# Patient Record
Sex: FEMALE | Employment: PART TIME | URBAN - METROPOLITAN AREA
[De-identification: names, ages, dates, MRNs, and addresses within clinical notes are randomized per-mention and may not be internally consistent; named-entity substitution may affect disease eponyms.]

---

## 2019-04-22 ENCOUNTER — OFFICE VISIT (OUTPATIENT)
Dept: INTERNAL MEDICINE CLINIC | Age: 39
End: 2019-04-22

## 2019-04-22 VITALS
HEIGHT: 59 IN | SYSTOLIC BLOOD PRESSURE: 118 MMHG | DIASTOLIC BLOOD PRESSURE: 84 MMHG | BODY MASS INDEX: 29.39 KG/M2 | TEMPERATURE: 97.9 F | OXYGEN SATURATION: 99 % | RESPIRATION RATE: 15 BRPM | HEART RATE: 69 BPM | WEIGHT: 145.8 LBS

## 2019-04-22 DIAGNOSIS — F32.1 CURRENT MODERATE EPISODE OF MAJOR DEPRESSIVE DISORDER, UNSPECIFIED WHETHER RECURRENT (HCC): ICD-10-CM

## 2019-04-22 DIAGNOSIS — E55.9 VITAMIN D DEFICIENCY: ICD-10-CM

## 2019-04-22 DIAGNOSIS — Z23 ENCOUNTER FOR IMMUNIZATION: ICD-10-CM

## 2019-04-22 DIAGNOSIS — N92.0 SPOTTING: ICD-10-CM

## 2019-04-22 DIAGNOSIS — Z00.00 ROUTINE GENERAL MEDICAL EXAMINATION AT A HEALTH CARE FACILITY: ICD-10-CM

## 2019-04-22 DIAGNOSIS — K62.5 RECTAL BLEEDING: Primary | ICD-10-CM

## 2019-04-22 DIAGNOSIS — K59.00 CONSTIPATION, UNSPECIFIED CONSTIPATION TYPE: ICD-10-CM

## 2019-04-22 RX ORDER — AMOXICILLIN 250 MG
1 CAPSULE ORAL DAILY
Qty: 30 TAB | Refills: 3 | Status: SHIPPED | OUTPATIENT
Start: 2019-04-22 | End: 2020-09-02 | Stop reason: SDUPTHER

## 2019-04-22 RX ORDER — LORATADINE 10 MG/1
10 TABLET ORAL
COMMUNITY
End: 2019-12-11 | Stop reason: ALTCHOICE

## 2019-04-22 RX ORDER — BUPROPION HYDROCHLORIDE 150 MG/1
150 TABLET ORAL
Qty: 30 TAB | Refills: 3 | Status: SHIPPED | OUTPATIENT
Start: 2019-04-22 | End: 2019-08-18 | Stop reason: SDUPTHER

## 2019-04-22 NOTE — PROGRESS NOTES
Health Maintenance Due Topic Date Due  
 DTaP/Tdap/Td series (1 - Tdap) 12/31/2002  PAP AKA CERVICAL CYTOLOGY  12/31/2002 No chief complaint on file. 1. Have you been to the ER, urgent care clinic since your last visit? Hospitalized since your last visit? No 
 
2. Have you seen or consulted any other health care providers outside of the [facility] since your last visit? Include any pap smears or colon screening. No 
 
3) Do you have an Advance Directive on file? no 
 
4) Are you interested in receiving information on Advance Directives? NO Patient is accompanied by  I have received verbal consent from SANYA Magallanes to discuss any/all medical information while they are present in the room. SANYA Magallanes is a 41 y.o. female  who presents for routine immunization(s) Tdap. Patient denies any symptoms , reactions or allergies that would exclude them from being immunized today. Risks and adverse reactions were discussed and the VIS was given to them. Patient voiced full understanding and signed Adult Immunization Consent form. All questions were addressed. Patient was observed for 10 min post injection. There were no reactions observed.  
 
Mohan Sotelo LPN

## 2019-04-22 NOTE — PROGRESS NOTES
HISTORY OF PRESENT ILLNESS Yi Trujillo is a 41 y.o. female here to establish care. Report constipation for long time. She is having bowel movement 1 or 2 times a week. Heavy having hard stool, every time she is straining she noticed blood in the stool. Report vaginal spotting during intercourse also. Having regular menses. She had a history of postpartum depression. She is still feeling depressed. Had suicidal thought in the past but not now. Having insomnia very often. Reports aches and pains in the joints and muscles. Very nonspecific. Here for complete physical. 
Need Tdap shot. HPI Review of Systems Constitutional: Negative. HENT: Negative. Eyes: Negative. Respiratory: Negative. Cardiovascular: Negative. Gastrointestinal: Positive for blood in stool and constipation. Genitourinary: Negative. Musculoskeletal: Negative. Skin: Negative. Neurological: Negative. Endo/Heme/Allergies: Negative. Psychiatric/Behavioral: Positive for depression. The patient has insomnia. Physical Exam  
Constitutional: She is oriented to person, place, and time. She appears well-developed and well-nourished. No distress. HENT:  
Head: Normocephalic and atraumatic. Right Ear: External ear normal.  
Left Ear: External ear normal.  
Nose: Nose normal.  
Mouth/Throat: Oropharynx is clear and moist. No oropharyngeal exudate. Eyes: Pupils are equal, round, and reactive to light. Conjunctivae and EOM are normal.  
Neck: Normal range of motion. Neck supple. No JVD present. Cardiovascular: Normal rate, regular rhythm, normal heart sounds and intact distal pulses. Pulmonary/Chest: Effort normal and breath sounds normal. No respiratory distress. She has no wheezes. Abdominal: Soft. Bowel sounds are normal. She exhibits no distension. There is no tenderness. Musculoskeletal: She exhibits no edema or tenderness. Lymphadenopathy:  
  She has no cervical adenopathy. Neurological: She is alert and oriented to person, place, and time. She has normal reflexes. She displays normal reflexes. No cranial nerve deficit. She exhibits normal muscle tone. Coordination normal.  
Psychiatric: PHQ score 13,moderate depression ASSESSMENT and PLAN Diagnoses and all orders for this visit: 1. Rectal bleeding Need more fluid and fibre. Possible anal fissure or hemorrhoid. 
-     REFERRAL TO COLON AND RECTAL SURGERY 
-     CBC WITH AUTOMATED DIFF 2. Routine general medical examination at a health care facility Seems healthy. adv to eat healthy and exercise. Will check, 
-     CBC WITH AUTOMATED DIFF 
-     METABOLIC PANEL, COMPREHENSIVE 
-     LIPID PANEL 
-     TSH 3RD GENERATION 
-     URINALYSIS W/ RFLX MICROSCOPIC 3. Spotting Having vaginal spotting during intercourse. Will refer, 
-     REFERRAL TO OBSTETRICS AND GYNECOLOGY 4. Constipation, unspecified constipation type High-fiber diet and fluid . Will give, 
-     psyllium seed-sucrose (METAMUCIL SUNRISE) powd; 1 table spoon in 8 oz of water QD 
-     senna-docusate (PERICOLACE) 8.6-50 mg per tablet; Take 1 Tab by mouth daily. 5. Vitamin D deficiency 
-     VITAMIN D, 25 HYDROXY 6. Encounter for immunization Will give, 
-     TETANUS, DIPHTHERIA TOXOIDS AND ACELLULAR PERTUSSIS VACCINE (TDAP), IN INDIVIDS. >=7, IM 
 
7. Current moderate episode of major depressive disorder, unspecified whether recurrent ([address] Moderate depression. Will start, 
-     buPROPion XL (WELLBUTRIN XL) 150 mg tablet; Take 1 Tab by mouth every morning. The risks and benefits of treatment were discussed as well as the potential side effects. The patient verbalized understanding and agrees to the current treatment plan. The patient is instructed to call the office with any side effects Discussed expected course/resolution/complications of diagnosis in detail with patient. Medication risks/benefits/costs/interactions/alternatives discussed with patient. Pt was given an after visit summary which includes diagnoses, current medications & vitals. Pt expressed understanding with the diagnosis and plan.

## 2019-04-22 NOTE — PATIENT INSTRUCTIONS

## 2019-04-23 LAB
25(OH)D3+25(OH)D2 SERPL-MCNC: 17 NG/ML (ref 30–100)
ALBUMIN SERPL-MCNC: 4.8 G/DL (ref 3.5–5.5)
ALBUMIN/GLOB SERPL: 1.6 {RATIO} (ref 1.2–2.2)
ALP SERPL-CCNC: 76 IU/L (ref 39–117)
ALT SERPL-CCNC: 13 IU/L (ref 0–32)
APPEARANCE UR: CLEAR
AST SERPL-CCNC: 21 IU/L (ref 0–40)
BASOPHILS # BLD AUTO: 0.1 X10E3/UL (ref 0–0.2)
BASOPHILS NFR BLD AUTO: 1 %
BILIRUB SERPL-MCNC: 0.3 MG/DL (ref 0–1.2)
BILIRUB UR QL STRIP: NEGATIVE
BUN SERPL-MCNC: 9 MG/DL (ref 6–20)
BUN/CREAT SERPL: 16 (ref 9–23)
CALCIUM SERPL-MCNC: 9.5 MG/DL (ref 8.7–10.2)
CHLORIDE SERPL-SCNC: 104 MMOL/L (ref 96–106)
CHOLEST SERPL-MCNC: 215 MG/DL (ref 100–199)
CO2 SERPL-SCNC: 26 MMOL/L (ref 20–29)
COLOR UR: YELLOW
CREAT SERPL-MCNC: 0.58 MG/DL (ref 0.57–1)
EOSINOPHIL # BLD AUTO: 0.4 X10E3/UL (ref 0–0.4)
EOSINOPHIL NFR BLD AUTO: 5 %
ERYTHROCYTE [DISTWIDTH] IN BLOOD BY AUTOMATED COUNT: 14.2 % (ref 12.3–15.4)
GLOBULIN SER CALC-MCNC: 3 G/DL (ref 1.5–4.5)
GLUCOSE SERPL-MCNC: 104 MG/DL (ref 65–99)
GLUCOSE UR QL: NEGATIVE
HCT VFR BLD AUTO: 40.9 % (ref 34–46.6)
HDLC SERPL-MCNC: 41 MG/DL
HGB BLD-MCNC: 13.2 G/DL (ref 11.1–15.9)
HGB UR QL STRIP: NEGATIVE
IMM GRANULOCYTES # BLD AUTO: 0 X10E3/UL (ref 0–0.1)
IMM GRANULOCYTES NFR BLD AUTO: 0 %
INTERPRETATION, 910389: NORMAL
KETONES UR QL STRIP: NEGATIVE
LDLC SERPL CALC-MCNC: 140 MG/DL (ref 0–99)
LEUKOCYTE ESTERASE UR QL STRIP: NEGATIVE
LYMPHOCYTES # BLD AUTO: 2.6 X10E3/UL (ref 0.7–3.1)
LYMPHOCYTES NFR BLD AUTO: 31 %
MCH RBC QN AUTO: 28.1 PG (ref 26.6–33)
MCHC RBC AUTO-ENTMCNC: 32.3 G/DL (ref 31.5–35.7)
MCV RBC AUTO: 87 FL (ref 79–97)
MICRO URNS: NORMAL
MONOCYTES # BLD AUTO: 0.5 X10E3/UL (ref 0.1–0.9)
MONOCYTES NFR BLD AUTO: 5 %
NEUTROPHILS # BLD AUTO: 5 X10E3/UL (ref 1.4–7)
NEUTROPHILS NFR BLD AUTO: 58 %
NITRITE UR QL STRIP: NEGATIVE
PH UR STRIP: 6 [PH] (ref 5–7.5)
PLATELET # BLD AUTO: 474 X10E3/UL (ref 150–379)
POTASSIUM SERPL-SCNC: 4.4 MMOL/L (ref 3.5–5.2)
PROT SERPL-MCNC: 7.8 G/DL (ref 6–8.5)
PROT UR QL STRIP: NEGATIVE
RBC # BLD AUTO: 4.7 X10E6/UL (ref 3.77–5.28)
SODIUM SERPL-SCNC: 142 MMOL/L (ref 134–144)
SP GR UR: 1.01 (ref 1–1.03)
TRIGL SERPL-MCNC: 168 MG/DL (ref 0–149)
TSH SERPL DL<=0.005 MIU/L-ACNC: 5.35 UIU/ML (ref 0.45–4.5)
UROBILINOGEN UR STRIP-MCNC: 0.2 MG/DL (ref 0.2–1)
VLDLC SERPL CALC-MCNC: 34 MG/DL (ref 5–40)
WBC # BLD AUTO: 8.5 X10E3/UL (ref 3.4–10.8)

## 2019-04-30 DIAGNOSIS — E55.9 VITAMIN D DEFICIENCY: Primary | ICD-10-CM

## 2019-04-30 RX ORDER — ERGOCALCIFEROL 1.25 MG/1
50000 CAPSULE ORAL
Qty: 4 CAP | Refills: 3 | Status: SHIPPED | OUTPATIENT
Start: 2019-04-30 | End: 2019-09-23 | Stop reason: ALTCHOICE

## 2019-04-30 NOTE — PROGRESS NOTES
Elevated platelet count. Will repeat platelet count next month. If still elevated I will send her to hematologist Dr. Heaven Mcwilliams. Low thyroid, please repeat a free T4 TSH and thyroid antibody panel in 6 weeks to decide if she needs any medications. vit D level very low.will start on vit D 50,000 unit 1 cap weekly for 4 months. will repeat level in 4 months. adv to be on milk product and expose to sun for 20 min a day. LDL is high with high total cholesterol. adv to be on low cholesterol diet and exercise. will repeat lipid in 6 months.

## 2019-05-03 ENCOUNTER — TELEPHONE (OUTPATIENT)
Dept: INTERNAL MEDICINE CLINIC | Age: 39
End: 2019-05-03

## 2019-05-03 DIAGNOSIS — R79.89 ELEVATED PLATELET COUNT: ICD-10-CM

## 2019-05-03 DIAGNOSIS — E03.9 HYPOTHYROIDISM, UNSPECIFIED TYPE: Primary | ICD-10-CM

## 2019-05-03 NOTE — PROGRESS NOTES
Pt's  called back and was after verifying HIPAA, was advised of the pt's lab results and recommendations.  Will mail results and lab slip to the pt

## 2019-05-22 ENCOUNTER — OFFICE VISIT (OUTPATIENT)
Dept: INTERNAL MEDICINE CLINIC | Age: 39
End: 2019-05-22

## 2019-05-22 VITALS
DIASTOLIC BLOOD PRESSURE: 76 MMHG | OXYGEN SATURATION: 99 % | TEMPERATURE: 96.7 F | WEIGHT: 140 LBS | BODY MASS INDEX: 28.22 KG/M2 | HEIGHT: 59 IN | SYSTOLIC BLOOD PRESSURE: 120 MMHG | RESPIRATION RATE: 14 BRPM | HEART RATE: 83 BPM

## 2019-05-22 DIAGNOSIS — M77.8 FOREARM TENDONITIS: ICD-10-CM

## 2019-05-22 DIAGNOSIS — L30.9 DERMATITIS: Primary | ICD-10-CM

## 2019-05-22 DIAGNOSIS — F32.1 CURRENT MODERATE EPISODE OF MAJOR DEPRESSIVE DISORDER, UNSPECIFIED WHETHER RECURRENT (HCC): ICD-10-CM

## 2019-05-22 DIAGNOSIS — E03.9 HYPOTHYROIDISM, UNSPECIFIED TYPE: ICD-10-CM

## 2019-05-22 DIAGNOSIS — K59.00 CONSTIPATION, UNSPECIFIED CONSTIPATION TYPE: ICD-10-CM

## 2019-05-22 RX ORDER — DICLOFENAC SODIUM 10 MG/G
GEL TOPICAL
Qty: 100 G | Refills: 1 | Status: SHIPPED | OUTPATIENT
Start: 2019-05-22 | End: 2021-04-01 | Stop reason: SDUPTHER

## 2019-05-22 RX ORDER — BETAMETHASONE VALERATE 1.2 MG/G
OINTMENT TOPICAL DAILY
Qty: 30 G | Refills: 0 | Status: SHIPPED | OUTPATIENT
Start: 2019-05-22 | End: 2022-05-26 | Stop reason: SDUPTHER

## 2019-05-22 NOTE — PROGRESS NOTES
Health Maintenance Due   Topic Date Due    BREAST CANCER SCRN MAMMOGRAM  12/31/1999    PAP AKA CERVICAL CYTOLOGY  12/31/2002       No chief complaint on file. 1. Have you been to the ER, urgent care clinic since your last visit? Hospitalized since your last visit? No    2. Have you seen or consulted any other health care providers outside of the [address] [place] [address] since your last visit? Include any pap smears or colon screening. No    3) Do you have an Advance Directive on file? no    4) Are you interested in receiving information on Advance Directives? NO      Patient is accompanied by spouse I have received verbal consent from SANYA Magallanes to discuss any/all medical information while they are present in the room.

## 2019-05-22 NOTE — PROGRESS NOTES
HISTORY OF PRESENT ILLNESS  Liam Forde is a 41 y.o. female here to follow-up. Accompanied by her . Depression seems under control. Would like to continue same dose of Wellbutrin. No suicidal thought. Report rash on left leg, itchy. Constipation has improved. Need to continue Metamucil. Needed refill. Scheduled to see a colorectal surgeon for hemorrhoidal bleeding. Reviewed left forearm and arm pain since she has lifted a heavy back. Pain is reproducible. She has an appointment to see gynecologist for vaginal spotting. Labs reviewed with patient, has abnormal TSH. Lipid is also elevated. She will do repeat TSH level in few weeks. HPI      Review of Systems   Constitutional: Negative. HENT: Negative. Eyes: Negative. Respiratory: Negative. Cardiovascular: Negative. Gastrointestinal: Positive for blood in stool and constipation. Genitourinary: Negative. Musculoskeletal: Negative. Skin: Negative. Neurological: Negative. Endo/Heme/Allergies: Negative. Psychiatric/Behavioral: Positive for depression. The patient has insomnia. Physical Exam   Constitutional: She appears well-developed and well-nourished. No distress. Neck: Normal range of motion. Neck supple. No JVD present. Cardiovascular: Normal rate, regular rhythm, normal heart sounds and intact distal pulses. Pulmonary/Chest: Effort normal and breath sounds normal. No respiratory distress. She has no wheezes. Musculoskeletal: She exhibits tenderness. She exhibits no edema. Left forearm: Mild tenderness on stretching. Range of motion is okay. Lymphadenopathy:     She has no cervical adenopathy. Neurological: She is alert. Skin: Skin is dry. Rash noted. Left leg: Approximately 3 x 5 cm area of dry scaly sandpaper lesion present slightly itchy. Psychiatric:   PHQ score 15,moderate depression       ASSESSMENT and PLAN  Diagnoses and all orders for this visit:    1.  Dermatitis    Will call in,  -     betamethasone valerate (VALISONE) 0.1 % ointment; Apply  to affected area daily. 2. Forearm tendonitis    Rest and ice pack. Will call in,  -     diclofenac (VOLTAREN) 1 % gel; Apply  to affected area two (2) times daily as needed for Pain. 3. Hypothyroidism, unspecified type  Has abnormal TSH. We will repeat TSH and free T4 in 2 more weeks. If it is abnormal, we will start her on Synthroid. 4. Constipation, unspecified constipation type    Need to drink more fluid and fiber. Will refill,  -     psyllium seed-sucrose (METAMUCIL SUNRISE) powd; 1 table spoon in 8 oz of water QD  Still having some hemorrhoidal bleeding sometimes when she is straining. Scheduled appointment with colorectal surgeon  5. Current moderate episode of major depressive disorder, unspecified whether recurrent ([address]    On Wellbutrin. Doing well. Discussed expected course/resolution/complications of diagnosis in detail with patient. Medication risks/benefits/costs/interactions/alternatives discussed with patient. Pt was given an after visit summary which includes diagnoses, current medications & vitals. Pt expressed understanding with the diagnosis and plan.

## 2019-05-28 ENCOUNTER — OFFICE VISIT (OUTPATIENT)
Dept: OBGYN CLINIC | Age: 39
End: 2019-05-28

## 2019-05-28 VITALS — DIASTOLIC BLOOD PRESSURE: 72 MMHG | WEIGHT: 142 LBS | SYSTOLIC BLOOD PRESSURE: 118 MMHG

## 2019-05-28 DIAGNOSIS — R10.2 PELVIC PAIN IN FEMALE: Primary | ICD-10-CM

## 2019-05-28 DIAGNOSIS — N94.10 DYSPAREUNIA, FEMALE: ICD-10-CM

## 2019-05-28 DIAGNOSIS — K59.00 CONSTIPATION, UNSPECIFIED CONSTIPATION TYPE: ICD-10-CM

## 2019-05-28 LAB
HCG URINE, QL. (POC): NEGATIVE
VALID INTERNAL CONTROL?: YES

## 2019-05-28 RX ORDER — BUPROPION HYDROCHLORIDE 150 MG/1
TABLET ORAL
COMMUNITY
Start: 2019-05-18 | End: 2019-06-13

## 2019-05-28 RX ORDER — POLOXAMER 188/SORBITOL/UREA
CLEANSER (ML) TOPICAL
COMMUNITY
Start: 2019-05-22 | End: 2021-12-02 | Stop reason: ALTCHOICE

## 2019-05-28 NOTE — PROGRESS NOTES
Pelvic Pain    CC: Pelvic Pain    HPI: Ms. Laila Larios is a 41 y.o. No obstetric history on file. female presenting for evaluation of pelvic pain. Patient's last menstrual period was 2019 (exact date). . Her past medical history is notable for nothing. She has no additional complaints and has not yet been evaluated for pelvic pain. Reports LLQ pain constantly that is worse with IC. Also reports constipation    Onset: month  Location of pain: LLQ  Duration: none  Quality of pain: none  Worse with menses? no  Pain between menstrual cycles? yes  Severity? 4 on a scale of 1-10  Timing: chronic. intermittent  Context: see above  Modifying factors:  Improves with rest    Other associated symptoms:   Denies vaginal discharge. Denies abdominal distension, diarrhea, dysuria, hematuria, incontinence, urinary urgency/frequency. Denies fevers/chills. Ob/Gyn Hx:  No obstetric history on file.    (2C/S)  Patient's last menstrual period was 2019 (exact date). Menses: Regular monthly cycles? yes  Contraception: condomes  STI- Denies  SA- yes    No past medical history on file. Past Surgical History:   Procedure Laterality Date    HX  SECTION      HX PELVIC LAPAROSCOPY         No family history on file.     Social History     Socioeconomic History    Marital status:      Spouse name: Not on file    Number of children: Not on file    Years of education: Not on file    Highest education level: Not on file   Occupational History    Not on file   Social Needs    Financial resource strain: Not on file    Food insecurity:     Worry: Not on file     Inability: Not on file    Transportation needs:     Medical: Not on file     Non-medical: Not on file   Tobacco Use    Smoking status: Never Smoker    Smokeless tobacco: Never Used   Substance and Sexual Activity    Alcohol use: Not Currently     Frequency: Never    Drug use: Never    Sexual activity: Yes     Partners: Male     Birth control/protection: Condom   Lifestyle    Physical activity:     Days per week: Not on file     Minutes per session: Not on file    Stress: Not on file   Relationships    Social connections:     Talks on phone: Not on file     Gets together: Not on file     Attends Denominational service: Not on file     Active member of club or organization: Not on file     Attends meetings of clubs or organizations: Not on file     Relationship status: Not on file    Intimate partner violence:     Fear of current or ex partner: Not on file     Emotionally abused: Not on file     Physically abused: Not on file     Forced sexual activity: Not on file   Other Topics Concern    Not on file   Social History Narrative    Not on file       Current Outpatient Medications   Medication Sig Dispense Refill    NATURAL DAILY FIBER 3.4 gram/5.8 gram powd       buPROPion XL (WELLBUTRIN XL) 150 mg tablet          Not on File    Review of Systems - History obtained from the patient  Constitutional: negative for weight loss, fever, night sweats  HEENT: negative for hearing loss, earache, congestion, snoring, sorethroat  CV: negative for chest pain, palpitations, edema  Resp: negative for cough, shortness of breath, wheezing  GI: negative for change in bowel habits, abdominal pain, black or bloody stools  : negative for frequency, dysuria, hematuria, vaginal discharge  MSK: negative for back pain, joint pain, muscle pain  Breast: negative for breast lumps, nipple discharge, galactorrhea  Skin :negative for itching, rash, hives  Neuro: negative for dizziness, headache, confusion, weakness  Psych: negative for anxiety, depression, change in mood  Heme/lymph: negative for bleeding, bruising, pallor    Physical Exam    Visit Vitals  /72 (BP 1 Location: Right arm, BP Patient Position: Sitting)   Wt 142 lb (64.4 kg)   LMP 05/19/2019 (Exact Date)       HENT  · Head and Face: appears normal    Neck  · Inspection/Palpation: normal appearance, no masses or tenderness  · Lymph Nodes: no lymphadenopathy present  · Thyroid: gland size normal, nontender, no nodules or masses present on palpation    Chest  · Respiratory Effort: non-labored breathing  · Auscultation: Clear to auscultation bilaterlly, normal breath sounds    Cardiovascular  · Heart:  · Auscultation: regular rate and rhythm without murmur  · Extremities: no peripheral edema    Gastrointestinal  · Abdominal Examination: abdomen non-tender to palpation, normal bowel sounds, no masses present  · Liver and spleen: no hepatomegaly present, spleen not palpable  · Hernias: no hernias identified    Genitourinary  · External Genitalia: normal appearance for age, no discharge present, no tenderness present, no inflammatory lesions present, no masses present, no atrophy present  · Vagina: normal vaginal vault without central or paravaginal defects, no discharge present, no inflammatory lesions present, no masses present  · Bladder: non-tender to palpation  · Urethra: appears normal  · Cervix: normal, no cervicitis, no CMT  · Uterus: normal size, shape and consistency, mobile  · Adnexa: no adnexal tenderness present, no adnexal masses present  · Perineum: perineum within normal limits, no evidence of trauma, no rashes or skin lesions present    Skin  · General Inspection: no rash, no lesions identified    Neurologic/Psychiatric  · Mental Status:  · Orientation: grossly oriented to person, place and time  · Mood and Affect: mood normal, affect appropriate    No results found for this or any previous visit. Patient was never admitted. Assessment/Plan:  41 y.o. with chronic pelvic pain. Discussed possible etiologies of pelvic pain including infectious verus gastrointestinal versus genitourinary versus idiopathic causes. Discussed possible adhesions from surgeries  STI testing sent . Urine culture UA sent .    DiscussedTVUS -ordered  Discussed increasing fiber now, may need to see GI in future  Discussed PRN NSAIDs now.     RTC:   For  [address] 5/28/2019  8:56 AM     Signed By: Burak Love MD     May 28, 2019

## 2019-05-29 LAB — BACTERIA UR CULT: NO GROWTH

## 2019-05-30 LAB
A VAGINAE DNA VAG QL NAA+PROBE: NORMAL SCORE
BVAB2 DNA VAG QL NAA+PROBE: NORMAL SCORE
C ALBICANS DNA VAG QL NAA+PROBE: NEGATIVE
C GLABRATA DNA VAG QL NAA+PROBE: NEGATIVE
C TRACH RRNA SPEC QL NAA+PROBE: NEGATIVE
MEGA1 DNA VAG QL NAA+PROBE: NORMAL SCORE
N GONORRHOEA RRNA SPEC QL NAA+PROBE: NEGATIVE
T VAGINALIS RRNA SPEC QL NAA+PROBE: NEGATIVE

## 2019-06-05 ENCOUNTER — HOSPITAL ENCOUNTER (EMERGENCY)
Age: 39
Discharge: HOME | End: 2019-06-05
Attending: EMERGENCY MEDICINE
Payer: COMMERCIAL

## 2019-06-05 ENCOUNTER — APPOINTMENT (OUTPATIENT)
Dept: CT IMAGING | Age: 39
End: 2019-06-05
Attending: PHYSICIAN ASSISTANT
Payer: COMMERCIAL

## 2019-06-05 VITALS
HEIGHT: 63 IN | SYSTOLIC BLOOD PRESSURE: 118 MMHG | HEART RATE: 74 BPM | BODY MASS INDEX: 25.16 KG/M2 | WEIGHT: 142 LBS | TEMPERATURE: 98.8 F | OXYGEN SATURATION: 99 % | RESPIRATION RATE: 18 BRPM | DIASTOLIC BLOOD PRESSURE: 72 MMHG

## 2019-06-05 DIAGNOSIS — R51.9 NONINTRACTABLE HEADACHE, UNSPECIFIED CHRONICITY PATTERN, UNSPECIFIED HEADACHE TYPE: Primary | ICD-10-CM

## 2019-06-05 LAB
ALBUMIN SERPL-MCNC: 3.9 G/DL (ref 3.5–5)
ALBUMIN/GLOB SERPL: 0.9 {RATIO} (ref 1.1–2.2)
ALP SERPL-CCNC: 89 U/L (ref 45–117)
ALT SERPL-CCNC: 20 U/L (ref 12–78)
ANION GAP SERPL CALC-SCNC: 7 MMOL/L (ref 5–15)
AST SERPL-CCNC: 21 U/L (ref 15–37)
BASOPHILS # BLD AUTO: 0 X10E3/UL (ref 0–0.2)
BASOPHILS # BLD: 0.1 K/UL (ref 0–0.1)
BASOPHILS NFR BLD AUTO: 1 %
BASOPHILS NFR BLD: 1 % (ref 0–1)
BILIRUB SERPL-MCNC: 0.3 MG/DL (ref 0.2–1)
BUN SERPL-MCNC: 10 MG/DL (ref 6–20)
BUN/CREAT SERPL: 17 (ref 12–20)
CALCIUM SERPL-MCNC: 9.6 MG/DL (ref 8.5–10.1)
CHLORIDE SERPL-SCNC: 106 MMOL/L (ref 97–108)
CO2 SERPL-SCNC: 24 MMOL/L (ref 21–32)
COMMENT, HOLDF: NORMAL
CREAT SERPL-MCNC: 0.58 MG/DL (ref 0.55–1.02)
DIFFERENTIAL METHOD BLD: ABNORMAL
EOSINOPHIL # BLD AUTO: 0.4 X10E3/UL (ref 0–0.4)
EOSINOPHIL # BLD: 0.3 K/UL (ref 0–0.4)
EOSINOPHIL NFR BLD AUTO: 5 %
EOSINOPHIL NFR BLD: 4 % (ref 0–7)
ERYTHROCYTE [DISTWIDTH] IN BLOOD BY AUTOMATED COUNT: 13.2 % (ref 11.5–14.5)
ERYTHROCYTE [DISTWIDTH] IN BLOOD BY AUTOMATED COUNT: 14.8 % (ref 12.3–15.4)
GLOBULIN SER CALC-MCNC: 4.4 G/DL (ref 2–4)
GLUCOSE SERPL-MCNC: 101 MG/DL (ref 65–100)
HCG UR QL: NEGATIVE
HCT VFR BLD AUTO: 34.4 % (ref 35–47)
HCT VFR BLD AUTO: 36.1 % (ref 34–46.6)
HGB BLD-MCNC: 10.7 G/DL (ref 11.5–16)
HGB BLD-MCNC: 12 G/DL (ref 11.1–15.9)
IMM GRANULOCYTES # BLD AUTO: 0 K/UL (ref 0–0.04)
IMM GRANULOCYTES # BLD AUTO: 0 X10E3/UL (ref 0–0.1)
IMM GRANULOCYTES NFR BLD AUTO: 0 %
IMM GRANULOCYTES NFR BLD AUTO: 0 % (ref 0–0.5)
LYMPHOCYTES # BLD AUTO: 2.2 X10E3/UL (ref 0.7–3.1)
LYMPHOCYTES # BLD: 2.9 K/UL (ref 0.8–3.5)
LYMPHOCYTES NFR BLD AUTO: 29 %
LYMPHOCYTES NFR BLD: 33 % (ref 12–49)
MCH RBC QN AUTO: 28.6 PG (ref 26–34)
MCH RBC QN AUTO: 28.8 PG (ref 26.6–33)
MCHC RBC AUTO-ENTMCNC: 31.1 G/DL (ref 30–36.5)
MCHC RBC AUTO-ENTMCNC: 33.2 G/DL (ref 31.5–35.7)
MCV RBC AUTO: 87 FL (ref 79–97)
MCV RBC AUTO: 92 FL (ref 80–99)
MONOCYTES # BLD AUTO: 0.4 X10E3/UL (ref 0.1–0.9)
MONOCYTES # BLD: 0.6 K/UL (ref 0–1)
MONOCYTES NFR BLD AUTO: 6 %
MONOCYTES NFR BLD: 7 % (ref 5–13)
NEUTROPHILS # BLD AUTO: 4.5 X10E3/UL (ref 1.4–7)
NEUTROPHILS NFR BLD AUTO: 59 %
NEUTS SEG # BLD: 4.8 K/UL (ref 1.8–8)
NEUTS SEG NFR BLD: 55 % (ref 32–75)
NRBC # BLD: 0 K/UL (ref 0–0.01)
NRBC BLD-RTO: 0 PER 100 WBC
PLATELET # BLD AUTO: 311 K/UL (ref 150–400)
PLATELET # BLD AUTO: 388 X10E3/UL (ref 150–450)
PMV BLD AUTO: 8.9 FL (ref 8.9–12.9)
POTASSIUM SERPL-SCNC: 4.1 MMOL/L (ref 3.5–5.1)
PROT SERPL-MCNC: 8.3 G/DL (ref 6.4–8.2)
RBC # BLD AUTO: 3.74 M/UL (ref 3.8–5.2)
RBC # BLD AUTO: 4.16 X10E6/UL (ref 3.77–5.28)
SAMPLES BEING HELD,HOLD: NORMAL
SODIUM SERPL-SCNC: 137 MMOL/L (ref 136–145)
T4 FREE SERPL-MCNC: 0.83 NG/DL (ref 0.82–1.77)
THYROGLOB AB SERPL-ACNC: 2.3 IU/ML (ref 0–0.9)
THYROPEROXIDASE AB SERPL-ACNC: 10 IU/ML (ref 0–34)
TSH SERPL DL<=0.005 MIU/L-ACNC: 3.6 UIU/ML (ref 0.45–4.5)
WBC # BLD AUTO: 7.5 X10E3/UL (ref 3.4–10.8)
WBC # BLD AUTO: 8.7 K/UL (ref 3.6–11)

## 2019-06-05 PROCEDURE — 74011250636 HC RX REV CODE- 250/636: Performed by: PHYSICIAN ASSISTANT

## 2019-06-05 PROCEDURE — 96361 HYDRATE IV INFUSION ADD-ON: CPT

## 2019-06-05 PROCEDURE — 81025 URINE PREGNANCY TEST: CPT

## 2019-06-05 PROCEDURE — 96374 THER/PROPH/DIAG INJ IV PUSH: CPT

## 2019-06-05 PROCEDURE — 85025 COMPLETE CBC W/AUTO DIFF WBC: CPT

## 2019-06-05 PROCEDURE — 99284 EMERGENCY DEPT VISIT MOD MDM: CPT

## 2019-06-05 PROCEDURE — 70450 CT HEAD/BRAIN W/O DYE: CPT

## 2019-06-05 PROCEDURE — 36415 COLL VENOUS BLD VENIPUNCTURE: CPT

## 2019-06-05 PROCEDURE — 80053 COMPREHEN METABOLIC PANEL: CPT

## 2019-06-05 PROCEDURE — 96375 TX/PRO/DX INJ NEW DRUG ADDON: CPT

## 2019-06-05 RX ORDER — ONDANSETRON 2 MG/ML
4 INJECTION INTRAMUSCULAR; INTRAVENOUS
Status: COMPLETED | OUTPATIENT
Start: 2019-06-05 | End: 2019-06-05

## 2019-06-05 RX ORDER — DIPHENHYDRAMINE HYDROCHLORIDE 50 MG/ML
25 INJECTION, SOLUTION INTRAMUSCULAR; INTRAVENOUS
Status: COMPLETED | OUTPATIENT
Start: 2019-06-05 | End: 2019-06-05

## 2019-06-05 RX ADMIN — PROCHLORPERAZINE EDISYLATE 10 MG: 5 INJECTION INTRAMUSCULAR; INTRAVENOUS at 10:43

## 2019-06-05 RX ADMIN — DIPHENHYDRAMINE HYDROCHLORIDE 25 MG: 50 INJECTION, SOLUTION INTRAMUSCULAR; INTRAVENOUS at 10:43

## 2019-06-05 RX ADMIN — SODIUM CHLORIDE 1000 ML: 900 INJECTION, SOLUTION INTRAVENOUS at 10:44

## 2019-06-05 RX ADMIN — ONDANSETRON 4 MG: 2 INJECTION INTRAMUSCULAR; INTRAVENOUS at 10:43

## 2019-06-05 NOTE — ED PROVIDER NOTES
41 y.o. female with no significant past medical history presents with complaints of left sided headache and dizziness which started suddenly at work today. The pt states that she has a hx of headaches but never this bad. The pt states that nothing makes the pain better or worse. The pt rates the pain as a 6/10 in severity. The pt describes the headache as a dull ache. The pt denies taking anything at home for the discomfort. Pt denies facial asymmetry, dysarthria, ataxia, unilateral weakness, or vision changes. There are no other acute medical complaints at this time. PCP: MD Kyra Gallego PA-C           Past Medical History:   Diagnosis Date    Depression        Past Surgical History:   Procedure Laterality Date    HX  SECTION      x2    HX  SECTION      HX DILATION AND CURETTAGE      HX PELVIC LAPAROSCOPY      HX SALPINGO-OOPHORECTOMY      left ,for ectopic pregnancy         Family History:   Problem Relation Age of Onset    Diabetes Mother     Heart Disease Mother     No Known Problems Father     Heart Disease Sister     Heart Disease Brother        Social History     Socioeconomic History    Marital status:      Spouse name: Not on file    Number of children: Not on file    Years of education: Not on file    Highest education level: Not on file   Occupational History    Not on file   Social Needs    Financial resource strain: Not on file    Food insecurity:     Worry: Not on file     Inability: Not on file    Transportation needs:     Medical: Not on file     Non-medical: Not on file   Tobacco Use    Smoking status: Never Smoker    Smokeless tobacco: Never Used   Substance and Sexual Activity    Alcohol use: Not Currently     Frequency: Never    Drug use: Never    Sexual activity: Yes     Partners: Male     Birth control/protection: Condom     Comment: working full time. 2 children.    Lifestyle    Physical activity:     Days per week: Not on file     Minutes per session: Not on file    Stress: Not on file   Relationships    Social connections:     Talks on phone: Not on file     Gets together: Not on file     Attends Voodoo service: Not on file     Active member of club or organization: Not on file     Attends meetings of clubs or organizations: Not on file     Relationship status: Not on file    Intimate partner violence:     Fear of current or ex partner: Not on file     Emotionally abused: Not on file     Physically abused: Not on file     Forced sexual activity: Not on file   Other Topics Concern    Not on file   Social History Narrative    ** Merged History Encounter **              ALLERGIES: Patient has no known allergies. Review of Systems   Constitutional: Negative for chills, diaphoresis and fever. HENT: Negative for congestion, postnasal drip, rhinorrhea and sore throat. Eyes: Negative for photophobia, discharge, redness and visual disturbance. Respiratory: Negative for cough, chest tightness, shortness of breath and wheezing. Cardiovascular: Negative for chest pain, palpitations and leg swelling. Gastrointestinal: Negative for abdominal distention, abdominal pain, blood in stool, constipation, diarrhea, nausea and vomiting. Genitourinary: Negative for difficulty urinating, dysuria, frequency, hematuria and urgency. Musculoskeletal: Negative for arthralgias, back pain, joint swelling and myalgias. Skin: Negative for color change and rash. Neurological: Positive for headaches. Negative for dizziness, speech difficulty, weakness, light-headedness and numbness. Psychiatric/Behavioral: Negative for confusion. The patient is not nervous/anxious. All other systems reviewed and are negative.       Vitals:    06/05/19 0919 06/05/19 0922   BP:  116/87   Pulse: 70 73   Resp:  20   Temp:  98.8 °F (37.1 °C)   SpO2: 100% 98%   Weight:  64.4 kg (142 lb)   Height:  5' 3\" (1.6 m)            Physical Exam Constitutional: She is oriented to person, place, and time. She appears well-developed and well-nourished. No distress. HENT:   Head: Normocephalic and atraumatic. Head is without raccoon's eyes, without Macdonald's sign and without laceration. Right Ear: Hearing, tympanic membrane, external ear and ear canal normal. No foreign bodies. Tympanic membrane is not bulging. No hemotympanum. Left Ear: Hearing, tympanic membrane, external ear and ear canal normal. No foreign bodies. Tympanic membrane is not bulging. No hemotympanum. Nose: Nose normal. No mucosal edema or rhinorrhea. Right sinus exhibits no maxillary sinus tenderness and no frontal sinus tenderness. Left sinus exhibits no maxillary sinus tenderness and no frontal sinus tenderness. Mouth/Throat: Uvula is midline, oropharynx is clear and moist and mucous membranes are normal. No tonsillar abscesses. Eyes: Pupils are equal, round, and reactive to light. Conjunctivae and EOM are normal. Right eye exhibits no discharge. Left eye exhibits no discharge. Neck: Normal range of motion. Neck supple. Cardiovascular: Normal rate, regular rhythm and normal heart sounds. Exam reveals no gallop and no friction rub. No murmur heard. Regular rate and rhythm. No murmurs, gallops, rubs, or clicks. Pulmonary/Chest: Effort normal and breath sounds normal. No respiratory distress. She has no wheezes. She has no rales. No stridor or wheezes. No accessory muscle usage. No nasal flaring. Breath Sounds equal bilaterally. Abdominal:       Musculoskeletal: Normal range of motion. She exhibits no edema, tenderness or deformity. Neurological: She is alert and oriented to person, place, and time. CN individually tested and are intact. No Pronator Drift. No nystagmus. Rapid Alternating movements are intact. Negative kernig's and brudzinski's sign. Skin: She is not diaphoretic. Nursing note and vitals reviewed.        MDM  Number of Diagnoses or Management Options  Nonintractable headache, unspecified chronicity pattern, unspecified headache type:   Diagnosis management comments: Pt afebrile and nontoxic appearing. Vitals, labs, physical exam, and imaging studies all unremarkable. No meningeal signs. No signs of PE, PTX, ACS, esophageal rupture, dissection, pancreatitis, appendicitis, cholecystitis/cholagnitis, bowel obstruction/perforation, sepsis or any other acute life threatening condition. Will treat symptomatically and advise close follow up with family doctor.   Homa Russell PA-C         Procedures

## 2019-06-05 NOTE — ED TRIAGE NOTES
Triage Note: Patient was at work today and stared with left sided headache and dizziness but denies syncopal episodes. No nausea or vomiting.

## 2019-06-05 NOTE — LETTER
[address] 
[address] [place], [place] [address] [id number] 
[phone] Work/School Note Date: 6/5/2019 To Whom It May concern: 
 
Bert Espinoza was seen and treated today in the emergency room by the following provider(s): 
Attending Provider: Grace Summers MD 
Physician Assistant: Dee Black PA-C. Bert Espinoza may return to work on 06/08/19.  
 
Sincerely, 
 
 
 
 
Anne Caldera PA-C

## 2019-06-05 NOTE — DISCHARGE INSTRUCTIONS
Patient Education        Headache: Care Instructions  Your Care Instructions    Headaches have many possible causes. Most headaches aren't a sign of a more serious problem, and they will get better on their own. Home treatment may help you feel better faster. The doctor has checked you carefully, but problems can develop later. If you notice any problems or new symptoms, get medical treatment right away. Follow-up care is a key part of your treatment and safety. Be sure to make and go to all appointments, and call your doctor if you are having problems. It's also a good idea to know your test results and keep a list of the medicines you take. How can you care for yourself at home? · Do not drive if you have taken a prescription pain medicine. · Rest in a quiet, dark room until your headache is gone. Close your eyes and try to relax or go to sleep. Don't watch TV or read. · Put a cold, moist cloth or cold pack on the painful area for 10 to 20 minutes at a time. Put a thin cloth between the cold pack and your skin. · Use a warm, moist towel or a heating pad set on low to relax tight shoulder and neck muscles. · Have someone gently massage your neck and shoulders. · Take pain medicines exactly as directed. ? If the doctor gave you a prescription medicine for pain, take it as prescribed. ? If you are not taking a prescription pain medicine, ask your doctor if you can take an over-the-counter medicine. · Be careful not to take pain medicine more often than the instructions allow, because you may get worse or more frequent headaches when the medicine wears off. · Do not ignore new symptoms that occur with a headache, such as a fever, weakness or numbness, vision changes, or confusion. These may be signs of a more serious problem. To prevent headaches  · Keep a headache diary so you can figure out what triggers your headaches. Avoiding triggers may help you prevent headaches.  Record when each headache began, how long it lasted, and what the pain was like (throbbing, aching, stabbing, or dull). Write down any other symptoms you had with the headache, such as nausea, flashing lights or dark spots, or sensitivity to bright light or loud noise. Note if the headache occurred near your period. List anything that might have triggered the headache, such as certain foods (chocolate, cheese, wine) or odors, smoke, bright light, stress, or lack of sleep. · Find healthy ways to deal with stress. Headaches are most common during or right after stressful times. Take time to relax before and after you do something that has caused a headache in the past.  · Try to keep your muscles relaxed by keeping good posture. Check your jaw, face, neck, and shoulder muscles for tension, and try relaxing them. When sitting at a desk, change positions often, and stretch for 30 seconds each hour. · Get plenty of sleep and exercise. · Eat regularly and well. Long periods without food can trigger a headache. · Treat yourself to a massage. Some people find that regular massages are very helpful in relieving tension. · Limit caffeine by not drinking too much coffee, tea, or soda. But don't quit caffeine suddenly, because that can also give you headaches. · Reduce eyestrain from computers by blinking frequently and looking away from the computer screen every so often. Make sure you have proper eyewear and that your monitor is set up properly, about an arm's length away. · Seek help if you have depression or anxiety. Your headaches may be linked to these conditions. Treatment can both prevent headaches and help with symptoms of anxiety or depression. When should you call for help? Call 911 anytime you think you may need emergency care. For example, call if:    · You have signs of a stroke. These may include:  ? Sudden numbness, paralysis, or weakness in your face, arm, or leg, especially on only one side of your body.   ? Sudden vision changes. ? Sudden trouble speaking. ? Sudden confusion or trouble understanding simple statements. ? Sudden problems with walking or balance. ? A sudden, severe headache that is different from past headaches.    Call your doctor now or seek immediate medical care if:    · You have a new or worse headache.     · Your headache gets much worse. Where can you learn more? Go to http://kishan-nichole.info/. Enter M271 in the search box to learn more about \"Headache: Care Instructions. \"  Current as of: Renetta 3, 2018  Content Version: 11.9  © 5145-9032 [employer]. Care instructions adapted under license by [employer] (which disclaims liability or warranty for this information). If you have questions about a medical condition or this instruction, always ask your healthcare professional. [place] [address] any warranty or liability for your use of this information. We hope that we have addressed all of your medical concerns. The examination and treatment you received in the Emergency Department were for an emergent problem and were not intended as complete care. It is important that you follow up with your healthcare provider(s) for ongoing care. If your symptoms worsen or do not improve as expected, and you are unable to reach your usual health care provider(s), you should return to the Emergency Department. Today's healthcare is undergoing tremendous change, and patient satisfaction surveys are one of the many tools to assess the quality of medical care. You may receive a survey from the [employer] organization regarding your experience in the Emergency Department. I hope that your experience has been completely positive, particularly the medical care that I provided. As such, please participate in the survey; anything less than excellent does not meet my expectations or intentions.         [address] [address] [facility] participate in nationally recognized quality of care measures. If your blood pressure is greater than 120/80, as reported below, we urge that you seek medical care to address the potential of high blood pressure, commonly known as hypertension. Hypertension can be hereditary or can be caused by certain medical conditions, pain, stress, or \"white coat syndrome. \"       Please make an appointment with your health care provider(s) for follow up of your Emergency Department visit. VITALS:   Patient Vitals for the past 8 hrs:   Temp Pulse Resp BP SpO2   06/05/19 0922 98.8 °F (37.1 °C) 73 20 116/87 98 %   06/05/19 0919 -- 70 -- -- 100 %          Thank you for allowing us to provide you with medical care today. We realize that you have many choices for your emergency care needs. Please choose us in the future for any continued health care needs. Mac Zamora [facility], [address] [place] [address].   Office: [phone]            Recent Results (from the past 24 hour(s))   METABOLIC PANEL, COMPREHENSIVE    Collection Time: 06/05/19  9:45 AM   Result Value Ref Range    Sodium 137 136 - 145 mmol/L    Potassium 4.1 3.5 - 5.1 mmol/L    Chloride 106 97 - 108 mmol/L    CO2 24 21 - 32 mmol/L    Anion gap 7 5 - 15 mmol/L    Glucose 101 (H) 65 - 100 mg/dL    BUN 10 6 - 20 MG/DL    Creatinine 0.58 0.55 - 1.02 MG/DL    BUN/Creatinine ratio 17 12 - 20      GFR est AA >60 >60 ml/min/1.73m2    GFR est non-AA >60 >60 ml/min/1.73m2    Calcium 9.6 8.5 - 10.1 MG/DL    Bilirubin, total 0.3 0.2 - 1.0 MG/DL    ALT (SGPT) 20 12 - 78 U/L    AST (SGOT) 21 15 - 37 U/L    Alk.  phosphatase 89 45 - 117 U/L    Protein, total 8.3 (H) 6.4 - 8.2 g/dL    Albumin 3.9 3.5 - 5.0 g/dL    Globulin 4.4 (H) 2.0 - 4.0 g/dL    A-G Ratio 0.9 (L) 1.1 - 2.2     SAMPLES BEING HELD    Collection Time: 06/05/19  9:45 AM   Result Value Ref Range    SAMPLES BEING HELD 1UA,1UC     COMMENT        Add-on orders for these samples will be processed based on acceptable specimen integrity and analyte stability, which may vary by analyte. HCG URINE, QL. - POC    Collection Time: 06/05/19  9:57 AM   Result Value Ref Range    Pregnancy test,urine (POC) NEGATIVE  NEG     CBC WITH AUTOMATED DIFF    Collection Time: 06/05/19 11:18 AM   Result Value Ref Range    WBC 8.7 3.6 - 11.0 K/uL    RBC 3.74 (L) 3.80 - 5.20 M/uL    HGB 10.7 (L) 11.5 - 16.0 g/dL    HCT 34.4 (L) 35.0 - 47.0 %    MCV 92.0 80.0 - 99.0 FL    MCH 28.6 26.0 - 34.0 PG    MCHC 31.1 30.0 - 36.5 g/dL    RDW 13.2 11.5 - 14.5 %    PLATELET 932 309 - 054 K/uL    MPV 8.9 8.9 - 12.9 FL    NRBC 0.0 0  WBC    ABSOLUTE NRBC 0.00 0.00 - 0.01 K/uL    NEUTROPHILS 55 32 - 75 %    LYMPHOCYTES 33 12 - 49 %    MONOCYTES 7 5 - 13 %    EOSINOPHILS 4 0 - 7 %    BASOPHILS 1 0 - 1 %    IMMATURE GRANULOCYTES 0 0.0 - 0.5 %    ABS. NEUTROPHILS 4.8 1.8 - 8.0 K/UL    ABS. LYMPHOCYTES 2.9 0.8 - 3.5 K/UL    ABS. MONOCYTES 0.6 0.0 - 1.0 K/UL    ABS. EOSINOPHILS 0.3 0.0 - 0.4 K/UL    ABS. BASOPHILS 0.1 0.0 - 0.1 K/UL    ABS. IMM. GRANS. 0.0 0.00 - 0.04 K/UL    DF AUTOMATED         Ct Head Wo Cont    Result Date: 6/5/2019  EXAM: CT HEAD WO CONT INDICATION: headache; dizziness COMPARISON: None. CONTRAST: None. TECHNIQUE: Unenhanced CT of the head was performed using 5 mm images. Brain and bone windows were generated. CT dose reduction was achieved through use of a standardized protocol tailored for this examination and automatic exposure control for dose modulation. FINDINGS: The ventricles and sulci are normal in size, shape and configuration and midline. There is no significant white matter disease. There is no intracranial hemorrhage, extra-axial collection, mass, mass effect or midline shift. The basilar cisterns are open. No acute infarct is identified. The bone windows demonstrate no abnormalities. There is a mucous changes cyst in the inferior right maxillary sinus. .     IMPRESSION: No evidence of acute process.

## 2019-06-11 NOTE — PROGRESS NOTES
Thyroid function test came back normal.  Only 1 antibodies and thyroid gland is slightly positive.   Will monitor thyroid function test.  No med needed right now

## 2019-06-13 ENCOUNTER — OFFICE VISIT (OUTPATIENT)
Dept: OBGYN CLINIC | Age: 39
End: 2019-06-13

## 2019-06-13 VITALS
DIASTOLIC BLOOD PRESSURE: 88 MMHG | WEIGHT: 138 LBS | BODY MASS INDEX: 24.45 KG/M2 | SYSTOLIC BLOOD PRESSURE: 122 MMHG | HEIGHT: 63 IN

## 2019-06-13 DIAGNOSIS — R10.2 PELVIC PAIN: Primary | ICD-10-CM

## 2019-06-17 DIAGNOSIS — E03.9 HYPOTHYROIDISM, UNSPECIFIED TYPE: ICD-10-CM

## 2019-06-17 DIAGNOSIS — R79.89 ELEVATED PLATELET COUNT: ICD-10-CM

## 2019-06-24 ENCOUNTER — TELEPHONE (OUTPATIENT)
Dept: INTERNAL MEDICINE CLINIC | Age: 39
End: 2019-06-24

## 2019-06-24 NOTE — TELEPHONE ENCOUNTER
Patients spouse called to make an appointment with Dr. Amisha Watt. He states patient is having pain level 8 in lower left side under abdomen. Appointment was offered with Chan Don on wed and I suggested he take the patient to [facility] clinic. Spouse was concerned about  for the appointment. I stated they have phones to use for interpretation. Spouse scheduled patient an appointment with Amisha Watt on 7/15/19. Spouse advised to take patient to urgent care or ER if symptoms get worse.

## 2019-07-15 ENCOUNTER — OFFICE VISIT (OUTPATIENT)
Dept: INTERNAL MEDICINE CLINIC | Age: 39
End: 2019-07-15

## 2019-07-15 VITALS
BODY MASS INDEX: 24.59 KG/M2 | RESPIRATION RATE: 15 BRPM | SYSTOLIC BLOOD PRESSURE: 116 MMHG | WEIGHT: 138.8 LBS | OXYGEN SATURATION: 98 % | HEIGHT: 63 IN | TEMPERATURE: 98.3 F | DIASTOLIC BLOOD PRESSURE: 74 MMHG | HEART RATE: 64 BPM

## 2019-07-15 DIAGNOSIS — F33.9 EPISODE OF RECURRENT MAJOR DEPRESSIVE DISORDER, UNSPECIFIED DEPRESSION EPISODE SEVERITY (HCC): ICD-10-CM

## 2019-07-15 DIAGNOSIS — M62.838 MUSCLE SPASM: Primary | ICD-10-CM

## 2019-07-15 DIAGNOSIS — E03.9 HYPOTHYROIDISM, UNSPECIFIED TYPE: ICD-10-CM

## 2019-07-15 DIAGNOSIS — R25.2 LEG CRAMP: ICD-10-CM

## 2019-07-15 DIAGNOSIS — R94.6 ABNORMAL THYROID FUNCTION TEST: ICD-10-CM

## 2019-07-15 DIAGNOSIS — Z12.4 SCREENING FOR CERVICAL CANCER: ICD-10-CM

## 2019-07-15 RX ORDER — BACLOFEN 10 MG/1
10 TABLET ORAL
Qty: 20 TAB | Refills: 0 | Status: SHIPPED | OUTPATIENT
Start: 2019-07-15 | End: 2020-07-13 | Stop reason: ALTCHOICE

## 2019-07-15 NOTE — PROGRESS NOTES
HISTORY OF PRESENT ILLNESS  Radha Agarwal is a 41 y.o. female here to follow-up. Accompanied by her . Report neck pain and stiffness for last several days. She works 35 hours a week. Not lifting weight. Report bilateral lower leg cramp on and off. Not drinking enough water. Having milk every day. Report left lower quadrant abdominal pain on and off. No pelvic discharge. No constipation now. Would like to get a birth control pill. Did not have a Pap smear for long time. Went to emergency room after feeling dizzy. Lab work was stable. She was dehydrated. Labs reviewed. Thyroid Problem   Associated symptoms include abdominal pain. Abdominal Pain   Associated symptoms include abdominal pain. Depression   Associated symptoms include abdominal pain. Neck Pain   Associated symptoms include abdominal pain. Review of Systems   Constitutional: Negative. HENT: Negative. Eyes: Negative. Respiratory: Negative. Cardiovascular: Negative. Gastrointestinal: Positive for abdominal pain. Genitourinary: Negative. Musculoskeletal: Positive for myalgias and neck pain. Skin: Negative. Neurological: Negative. Endo/Heme/Allergies: Negative. Psychiatric/Behavioral: Positive for depression. Physical Exam   Constitutional: She appears well-developed and well-nourished. No distress. Neck: Normal range of motion. Neck supple. No JVD present. Cardiovascular: Normal rate, regular rhythm, normal heart sounds and intact distal pulses. Pulmonary/Chest: Effort normal and breath sounds normal. No respiratory distress. She has no wheezes. Abdominal: Soft. Bowel sounds are normal. She exhibits no distension. There is no tenderness. Musculoskeletal: She exhibits no edema. Neck: Spine nontender. Mild paravertebral muscle spasm on left side. Range of motion mildly restricted. Lymphadenopathy:     She has no cervical adenopathy. Neurological: She is alert.    Skin: Skin is dry. Psychiatric: She has a normal mood and affect. Her behavior is normal.   The patient has improved. No insomnia. ASSESSMENT and PLAN  Diagnoses and all orders for this visit:    1. Muscle spasm    Heating pad and massage. Need to change pillow. Will give,  -     baclofen (LIORESAL) 10 mg tablet; Take 1 Tab by mouth two (2) times daily as needed for Pain. 2. Hypothyroidism, unspecified type  No need for Synthroid. 3. Episode of recurrent major depressive disorder, unspecified depression episode severity ([address]  Controlled. On Wellbutrin. Will not change dosage. Follow-up in 6 months. 4. Abnormal thyroid function test  Last TSH normal.  Will monitor in 6 months. 5. Leg cramp  Need to drink more fluid. Taking calcium product. Having been on every day. 6. Screening for cervical cancer    Abdominal pain is probably functional cyst pain. Advised to see GYN for Pap smear, pelvic exams and discussion of birth control pill. Not able to tolerate IUD in the past.    -     REFERRAL TO OBSTETRICS AND GYNECOLOGY          Discussed expected course/resolution/complications of diagnosis in detail with patient. Medication risks/benefits/costs/interactions/alternatives discussed with patient. Pt was given an after visit summary which includes diagnoses, current medications & vitals. Pt expressed understanding with the diagnosis and plan.

## 2019-07-15 NOTE — PROGRESS NOTES
Health Maintenance Due   Topic Date Due    PAP AKA CERVICAL CYTOLOGY  12/31/2002       Chief Complaint   Patient presents with    Abdominal Pain     left sided    Thyroid Problem    Depression       1. Have you been to the ER, urgent care clinic since your last visit? Hospitalized since your last visit? Yes When: 6/5/19 [facility] ED for syncope    2. Have you seen or consulted any other health care providers outside of the [address] [place] [address] [place] since your last visit? Include any pap smears or colon screening. No    3) Do you have an Advance Directive on file? no    4) Are you interested in receiving information on Advance Directives? NO      Patient is accompanied by son and  I have received verbal consent from SANYA Magallanes to discuss any/all medical information while they are present in the room.

## 2019-08-18 DIAGNOSIS — F32.1 CURRENT MODERATE EPISODE OF MAJOR DEPRESSIVE DISORDER, UNSPECIFIED WHETHER RECURRENT (HCC): ICD-10-CM

## 2019-08-19 RX ORDER — BUPROPION HYDROCHLORIDE 150 MG/1
TABLET ORAL
Qty: 30 TAB | Refills: 3 | Status: SHIPPED | OUTPATIENT
Start: 2019-08-19 | End: 2020-06-08 | Stop reason: SDUPTHER

## 2019-09-23 ENCOUNTER — OFFICE VISIT (OUTPATIENT)
Dept: INTERNAL MEDICINE CLINIC | Age: 39
End: 2019-09-23

## 2019-09-23 VITALS
HEART RATE: 79 BPM | WEIGHT: 138.8 LBS | SYSTOLIC BLOOD PRESSURE: 108 MMHG | BODY MASS INDEX: 24.59 KG/M2 | DIASTOLIC BLOOD PRESSURE: 70 MMHG | RESPIRATION RATE: 13 BRPM | HEIGHT: 63 IN | TEMPERATURE: 97.9 F | OXYGEN SATURATION: 98 %

## 2019-09-23 DIAGNOSIS — R10.30 LOWER ABDOMINAL PAIN: ICD-10-CM

## 2019-09-23 DIAGNOSIS — G47.00 INSOMNIA, UNSPECIFIED TYPE: ICD-10-CM

## 2019-09-23 DIAGNOSIS — Z23 ENCOUNTER FOR IMMUNIZATION: Primary | ICD-10-CM

## 2019-09-23 DIAGNOSIS — E03.9 HYPOTHYROIDISM, UNSPECIFIED TYPE: ICD-10-CM

## 2019-09-23 DIAGNOSIS — F41.8 DEPRESSION WITH ANXIETY: ICD-10-CM

## 2019-09-23 DIAGNOSIS — M62.838 NECK MUSCLE SPASM: ICD-10-CM

## 2019-09-23 DIAGNOSIS — E55.9 VITAMIN D DEFICIENCY: ICD-10-CM

## 2019-09-23 DIAGNOSIS — K59.09 CONSTIPATION, CHRONIC: ICD-10-CM

## 2019-09-23 RX ORDER — LUBIPROSTONE 8 UG/1
8 CAPSULE, GELATIN COATED ORAL
Qty: 30 CAP | Refills: 2 | Status: SHIPPED | OUTPATIENT
Start: 2019-09-23 | End: 2020-09-02 | Stop reason: SDUPTHER

## 2019-09-23 RX ORDER — MELATONIN
1000 DAILY
Qty: 30 TAB | Refills: 2 | Status: SHIPPED | OUTPATIENT
Start: 2019-09-23 | End: 2020-11-23

## 2019-09-23 RX ORDER — AMITRIPTYLINE HYDROCHLORIDE 10 MG/1
10 TABLET, FILM COATED ORAL
Qty: 30 TAB | Refills: 2 | Status: SHIPPED | OUTPATIENT
Start: 2019-09-23 | End: 2020-06-08 | Stop reason: ALTCHOICE

## 2019-09-23 NOTE — PROGRESS NOTES
HISTORY OF PRESENT ILLNESS  Yessi Barry is a 41 y.o. female here to follow-up. Accompanied by her . Report insomnia for last 3 or 4 weeks. She thinks her depression not controlled. She believed that her  is too busy at work and not able to give her any type. No suicidal thought. Taking Wellbutrin every day. Has neck pain and stiffness. Not getting better. Sometimes her right knee bothers her. No fall or injury. Has chronic lower abdominal pain, she remains constipated. Has seen GYN. Ultrasound of the lower abdomen was negative. Labs reviewed. Thyroid is normal.  Need flu shot. Abdominal Pain   Associated symptoms include abdominal pain. Thyroid Problem   Associated symptoms include abdominal pain. Depression   Associated symptoms include abdominal pain. Neck Pain   Associated symptoms include abdominal pain. Immunization/Injection   Associated symptoms include abdominal pain. Knee Pain   Associated symptoms include abdominal pain. Review of Systems   Constitutional: Negative. HENT: Negative. Eyes: Negative. Respiratory: Negative. Cardiovascular: Negative. Gastrointestinal: Positive for abdominal pain. Genitourinary: Negative. Musculoskeletal: Positive for joint pain, myalgias and neck pain. Skin: Negative. Neurological: Negative. Endo/Heme/Allergies: Negative. Psychiatric/Behavioral: Positive for depression. The patient has insomnia. Physical Exam   Constitutional: She appears well-developed and well-nourished. No distress. Neck: Normal range of motion. Neck supple. No JVD present. Cardiovascular: Normal rate, regular rhythm, normal heart sounds and intact distal pulses. Pulmonary/Chest: Effort normal and breath sounds normal. No respiratory distress. She has no wheezes. Abdominal: Soft. Bowel sounds are normal. She exhibits no distension. There is no tenderness. Musculoskeletal: She exhibits no edema.    Neck: Spine nontender. Mild paravertebral muscle spasm on left side. Range of motion mildly restricted. Right knee: Mild tenderness present. No joint crepitus. Range of motion is okay. Lymphadenopathy:     She has no cervical adenopathy. Neurological: She is alert. Skin: Skin is dry. Psychiatric: She has a normal mood and affect. Her behavior is normal.   Seems depressed. Has insomnia. ASSESSMENT and PLAN  Diagnoses and all orders for this visit:    1. Encounter for immunization  -     INFLUENZA VIRUS VAC QUAD,SPLIT,PRESV FREE SYRINGE IM  -     ID IMMUNIZ ADMIN,1 SINGLE/COMB VAC/TOXOID    2. Lower abdominal pain    Ultrasound of the lower abdomen is negative for any abnormalities. She remains constipated. Advised to have high-fiber diet. Will give,  -     amitriptyline (ELAVIL) 10 mg tablet; Take 1 Tab by mouth nightly. 3. Depression with anxiety  Already on Wellbutrin  mg in the morning. Has marital issue, she believes that her  is not giving any time. He feels sad and depressed. Discussed with , advised him to give his wife some time. Will not increase the dose of Wellbutrin. 4. Hypothyroidism, unspecified type  Last lab work normal.  Not on medicine. 5. Neck muscle spasm    Heating pad and massage. Will refer,  -     REFERRAL TO PHYSICAL THERAPY    6. Insomnia, unspecified type    Will try,  -     amitriptyline (ELAVIL) 10 mg tablet; Take 1 Tab by mouth nightly. 7. Vitamin D deficiency    Initiate high-dose of vitamin D, will give,  -     cholecalciferol (VITAMIN D3) 1,000 unit tablet; Take 1 Tab by mouth daily. 8. Constipation, chronic    High-fiber diet. Using fiber every day also seen a soft. Not helping her. Will add,  -     lubiPROStone (AMITIZA) 8 mcg capsule; Take 1 Cap by mouth daily (with breakfast). Discussed expected course/resolution/complications of diagnosis in detail with patient.    Medication risks/benefits/costs/interactions/alternatives discussed with patient. Pt was given an after visit summary which includes diagnoses, current medications & vitals. Pt expressed understanding with the diagnosis and plan.

## 2019-09-23 NOTE — PROGRESS NOTES
Health Maintenance Due   Topic Date Due    PAP AKA CERVICAL CYTOLOGY  12/31/2002    Influenza Age 5 to Adult  08/01/2019       Chief Complaint   Patient presents with    Thyroid Problem       1. Have you been to the ER, urgent care clinic since your last visit? Hospitalized since your last visit? No    2. Have you seen or consulted any other health care providers outside of the [address] [place] [address] since your last visit? Include any pap smears or colon screening. No    3) Do you have an Advance Directive on file? no    4) Are you interested in receiving information on Advance Directives? NO      Patient is accompanied by spouse I have received verbal consent from SANYA Magallanes to discuss any/all medical information while they are present in the room. SANYA Magallanes is a 41 y.o. female  who presents for routine immunization(s) Fluarix Quadrivalent. Patient denies any symptoms , reactions or allergies that would exclude them from being immunized today. Risks and adverse reactions were discussed and the VIS was given to them. Patient voiced full understanding and signed Adult Immunization Consent form. All questions were addressed. Patient was observed for 10 min post injection. There were no reactions observed.     Rick Horne LPN

## 2019-09-23 NOTE — LETTER
NOTIFICATION RETURN TO WORK / SCHOOL 
 
9/23/2019 9:06 AM 
 
Ms. Radha Boyd [address] To Whom It May Concern: 
 
Radha Boyd is currently under the care of [address]. She will return to work/school on: 09/23/2019 If there are questions or concerns please have the patient contact our office. Sincerely, Lord Rene MD

## 2019-09-23 NOTE — PATIENT INSTRUCTIONS
Vaccine Information Statement Influenza (Flu) Vaccine (Inactivated or Recombinant): What You Need to Know Many Vaccine Information Statements are available in Thai and other languages. See www.immunize.org/vis Hojas de información sobre vacunas están disponibles en español y en muchos otros idiomas. Visite www.immunize.org/vis 1. Why get vaccinated? Influenza vaccine can prevent influenza (flu). Flu is a contagious disease that spreads around the [facility] every year, usually between October and May. Anyone can get the flu, but it is more dangerous for some people. Infants and young children, people 72years of age and older, pregnant women, and people with certain health conditions or a weakened immune system are at greatest risk of flu complications. Pneumonia, bronchitis, sinus infections and ear infections are examples of flu-related complications. If you have a medical condition, such as heart disease, cancer or diabetes, flu can make it worse. Flu can cause fever and chills, sore throat, muscle aches, fatigue, cough, headache, and runny or stuffy nose. Some people may have vomiting and diarrhea, though this is more common in children than adults. Each year thousands of people in the [facility] die from flu, and many more are hospitalized. Flu vaccine prevents millions of illnesses and flu-related visits to the doctor each year. 2. Influenza vaccines CDC recommends everyone 10months of age and older get vaccinated every flu season. Children 6 months through 6years of age may need 2 doses during a single flu season. Everyone else needs only 1 dose each flu season. It takes about 2 weeks for protection to develop after vaccination. There are many flu viruses, and they are always changing. Each year a new flu vaccine is made to protect against three or four viruses that are likely to cause disease in the upcoming flu season.  Even when the vaccine doesnt exactly match these viruses, it may still provide some protection. Influenza vaccine does not cause flu. Influenza vaccine may be given at the same time as other vaccines. 3. Talk with your health care provider Tell your vaccine provider if the person getting the vaccine: 
 Has had an allergic reaction after a previous dose of influenza vaccine, or has any severe, life-threatening allergies.  Has ever had Guillain-Barré Syndrome (also called GBS). In some cases, your health care provider may decide to postpone influenza vaccination to a future visit. People with minor illnesses, such as a cold, may be vaccinated. People who are moderately or severely ill should usually wait until they recover before getting influenza vaccine. Your health care provider can give you more information. 4. Risks of a reaction  Soreness, redness, and swelling where shot is given, fever, muscle aches, and headache can happen after influenza vaccine.  There may be a very small increased risk of Guillain-Barré Syndrome (GBS) after inactivated influenza vaccine (the flu shot). Brenton Schaumann children who get the flu shot along with pneumococcal vaccine (PCV13), and/or DTaP vaccine at the same time might be slightly more likely to have a seizure caused by fever. Tell your health care provider if a child who is getting flu vaccine has ever had a seizure. People sometimes faint after medical procedures, including vaccination. Tell your provider if you feel dizzy or have vision changes or ringing in the ears. As with any medicine, there is a very remote chance of a vaccine causing a severe allergic reaction, other serious injury, or death. 5. What if there is a serious problem? An allergic reaction could occur after the vaccinated person leaves the clinic.  If you see signs of a severe allergic reaction (hives, swelling of the face and throat, difficulty breathing, a fast heartbeat, dizziness, or weakness), call 9-1-1 and get the person to the nearest hospital. 
 
For other signs that concern you, call your health care provider. Adverse reactions should be reported to the Vaccine Adverse Event Reporting System (VAERS). Your health care provider will usually file this report, or you can do it yourself. Visit the VAERS website at www.vaers. hhs.gov or call 8-447.340.3122. VAERS is only for reporting reactions, and VAERS staff do not give medical advice. 6. The National Vaccine Injury Compensation Program 
 
The [facility] Vaccine Injury Compensation Program (VICP) is a federal program that was created to compensate people who may have been injured by certain vaccines. Visit the VICP website at www.[facility].gov/vaccinecompensation or call 0-162.508.7273 to learn about the program and about filing a claim. There is a time limit to file a claim for compensation. 7. How can I learn more?  Ask your health care provider.  Call your local or state health department.  Contact the Centers for Disease Control and Prevention (CDC): 
- Call 7-325.575.4565 (1-800-CDC-INFO) or 
- Visit CDCs influenza website at www.cdc.gov/flu Vaccine Information Statement (Interim) Inactivated Influenza Vaccine 8/15/2019 
[address] Quach [id number] Department of Health and [employer] Centers for Disease Control and Prevention Office Use Only

## 2019-10-07 ENCOUNTER — DOCUMENTATION ONLY (OUTPATIENT)
Dept: INTERNAL MEDICINE CLINIC | Age: 39
End: 2019-10-07

## 2019-10-15 ENCOUNTER — APPOINTMENT (OUTPATIENT)
Dept: PHYSICAL THERAPY | Age: 39
End: 2019-10-15

## 2019-11-08 ENCOUNTER — DOCUMENTATION ONLY (OUTPATIENT)
Dept: INTERNAL MEDICINE CLINIC | Age: 39
End: 2019-11-08

## 2019-12-11 ENCOUNTER — OFFICE VISIT (OUTPATIENT)
Dept: INTERNAL MEDICINE CLINIC | Age: 39
End: 2019-12-11

## 2019-12-11 VITALS
WEIGHT: 143 LBS | HEART RATE: 76 BPM | SYSTOLIC BLOOD PRESSURE: 132 MMHG | HEIGHT: 63 IN | OXYGEN SATURATION: 98 % | DIASTOLIC BLOOD PRESSURE: 74 MMHG | TEMPERATURE: 97.8 F | BODY MASS INDEX: 25.34 KG/M2 | RESPIRATION RATE: 15 BRPM

## 2019-12-11 DIAGNOSIS — M67.40 GANGLION CYST: ICD-10-CM

## 2019-12-11 DIAGNOSIS — K59.09 CONSTIPATION, CHRONIC: ICD-10-CM

## 2019-12-11 DIAGNOSIS — Z12.4 SCREENING FOR CERVICAL CANCER: ICD-10-CM

## 2019-12-11 DIAGNOSIS — J30.1 ALLERGIC RHINITIS DUE TO POLLEN, UNSPECIFIED SEASONALITY: ICD-10-CM

## 2019-12-11 DIAGNOSIS — F41.8 DEPRESSION WITH ANXIETY: Primary | ICD-10-CM

## 2019-12-11 DIAGNOSIS — E03.9 HYPOTHYROIDISM, UNSPECIFIED TYPE: ICD-10-CM

## 2019-12-11 RX ORDER — MINERAL OIL
180 ENEMA (ML) RECTAL
Qty: 30 TAB | Refills: 1 | Status: SHIPPED | OUTPATIENT
Start: 2019-12-11 | End: 2020-04-01 | Stop reason: SDUPTHER

## 2019-12-11 NOTE — PROGRESS NOTES
HISTORY OF PRESENT ILLNESS  Cheyenne Arevalo is a 41 y.o. female here to follow-up. Accompanied by her . Reports cyst on left wrist for last several months. Sometimes her wrist hurts with movement. Depression seems under control. Taking Wellbutrin every day. No suicidal thought. Insomnia seems better. Taking amitriptyline at night. Has gained some weight. Had low thyroid in the past.  Off of thyroid medicine. Will monitor blood work. Has chronic constipation. Taking medicine. Doing well. Report nasal congestion and postnasal drip lately. Would like to get an allergy medicine. Labs reviewed. Thyroid Problem     Knee Pain     Depression     Hand Pain      Insomnia         Review of Systems   Constitutional: Negative. HENT: Positive for congestion. Eyes: Negative. Respiratory: Negative. Cardiovascular: Negative. Genitourinary: Negative. Musculoskeletal: Positive for joint pain. Skin: Negative. Neurological: Negative. Endo/Heme/Allergies: Negative. Psychiatric/Behavioral: Positive for depression. The patient has insomnia. Physical Exam  Constitutional:       General: She is not in acute distress. Appearance: She is well-developed. HENT:      Head: Normocephalic. Right Ear: Tympanic membrane normal.      Left Ear: Tympanic membrane normal.      Nose: Congestion present. Comments: Nasal turbinates:red inflamed,NT    Cobble stoning present. Neck:      Musculoskeletal: Normal range of motion and neck supple. Vascular: No JVD. Cardiovascular:      Rate and Rhythm: Normal rate and regular rhythm. Heart sounds: Normal heart sounds. Pulmonary:      Effort: Pulmonary effort is normal. No respiratory distress. Breath sounds: Normal breath sounds. No wheezing. Abdominal:      General: Bowel sounds are normal. There is no distension. Palpations: Abdomen is soft. Tenderness: There is no tenderness.    Musculoskeletal: Comments: Left wrist: Small ganglion cyst present on lateral side of the wrist.  Range of motion mildly restricted. Lymphadenopathy:      Cervical: No cervical adenopathy. Skin:     General: Skin is dry. Neurological:      Mental Status: She is alert. Psychiatric:         Mood and Affect: Mood normal.         Behavior: Behavior normal.      Comments: Depression seems under control. ASSESSMENT and PLAN  Diagnoses and all orders for this visit:    1. Depression with anxiety    Stable depression. On Wellbutrin XL. Will check,  -     METABOLIC PANEL, COMPREHENSIVE    2. Hypothyroidism, unspecified type  Not on thyroid medicine. Will repeat,    -     TSH 3RD GENERATION    3. Constipation, chronic  On Amitiza. Doing well. 4. Ganglion cyst    Will refer,  -     REFERRAL TO ORTHOPEDICS    5. Screening for cervical cancer  -     REFERRAL TO OBSTETRICS AND GYNECOLOGY    6. Allergic rhinitis due to pollen, unspecified seasonality     will give,      -     fexofenadine (ALLEGRA) 180 mg tablet; Take 1 Tab by mouth daily as needed for Allergies. Discussed expected course/resolution/complications of diagnosis in detail with patient. Medication risks/benefits/costs/interactions/alternatives discussed with patient. Pt was given an after visit summary which includes diagnoses, current medications & vitals. Pt expressed understanding with the diagnosis and plan.

## 2019-12-11 NOTE — PROGRESS NOTES
Health Maintenance Due   Topic Date Due    PAP AKA CERVICAL CYTOLOGY  12/31/2002       Chief Complaint   Patient presents with    Hand Pain     right hand    Thyroid Problem    Insomnia       1. Have you been to the ER, urgent care clinic since your last visit? Hospitalized since your last visit? No    2. Have you seen or consulted any other health care providers outside of the [address] [place] [address] since your last visit? Include any pap smears or colon screening. No    3) Do you have an Advance Directive on file? no    4) Are you interested in receiving information on Advance Directives? NO      Patient is accompanied by  I have received verbal consent from SANYA Magallanes to discuss any/all medical information while they are present in the room.

## 2020-01-14 LAB
ALBUMIN SERPL-MCNC: 4.3 G/DL (ref 3.5–5.5)
ALBUMIN/GLOB SERPL: 1.4 {RATIO} (ref 1.2–2.2)
ALP SERPL-CCNC: 71 IU/L (ref 39–117)
ALT SERPL-CCNC: 10 IU/L (ref 0–32)
AST SERPL-CCNC: 13 IU/L (ref 0–40)
BILIRUB SERPL-MCNC: 0.3 MG/DL (ref 0–1.2)
BUN SERPL-MCNC: 11 MG/DL (ref 6–20)
BUN/CREAT SERPL: 20 (ref 9–23)
CALCIUM SERPL-MCNC: 9.4 MG/DL (ref 8.7–10.2)
CHLORIDE SERPL-SCNC: 103 MMOL/L (ref 96–106)
CO2 SERPL-SCNC: 23 MMOL/L (ref 20–29)
CREAT SERPL-MCNC: 0.56 MG/DL (ref 0.57–1)
GLOBULIN SER CALC-MCNC: 3 G/DL (ref 1.5–4.5)
GLUCOSE SERPL-MCNC: 106 MG/DL (ref 65–99)
POTASSIUM SERPL-SCNC: 4 MMOL/L (ref 3.5–5.2)
PROT SERPL-MCNC: 7.3 G/DL (ref 6–8.5)
SODIUM SERPL-SCNC: 140 MMOL/L (ref 134–144)
TSH SERPL DL<=0.005 MIU/L-ACNC: 8.76 UIU/ML (ref 0.45–4.5)

## 2020-01-15 DIAGNOSIS — E03.9 HYPOTHYROIDISM, UNSPECIFIED TYPE: Primary | ICD-10-CM

## 2020-01-15 RX ORDER — LEVOTHYROXINE SODIUM 25 UG/1
25 TABLET ORAL
Qty: 30 TAB | Refills: 3 | Status: SHIPPED | OUTPATIENT
Start: 2020-01-15 | End: 2020-06-08 | Stop reason: SDUPTHER

## 2020-01-15 NOTE — PROGRESS NOTES
Low thyroid. We will start her on Synthroid 25 mcg every morning. Repeat TSH in 6 weeks. All other labs are stable.

## 2020-01-16 NOTE — PROGRESS NOTES
Called and spoke with the pt's  (HIPAA) and after verifying HIPAA advised him of the pt's lab results and the provider's recommendations. A results letter was mailed to the pt along with the lab order slip.

## 2020-02-26 DIAGNOSIS — E03.9 HYPOTHYROIDISM, UNSPECIFIED TYPE: ICD-10-CM

## 2020-03-26 ENCOUNTER — TELEPHONE (OUTPATIENT)
Dept: INTERNAL MEDICINE CLINIC | Age: 40
End: 2020-03-26

## 2020-03-27 NOTE — TELEPHONE ENCOUNTER
Returned Shoaib's call and after verifying HIPAA discussed that the pt is suffering from a dry cough that is worse at night. She has not traveled recently, is afebrile and her cough is non productive. Advised that Dr. Vanesa Padilla is out of the office and will return on Monday. He voiced full understanding. Advised that the pt may take plain OTC Mucinex, but he refused stating that Dr. Vanesa Padilla will give a \"good prescription\" when she returns Monday. He also refused an appointment or virtual visit for the pt. He voiced thanks and understanding and will wait until then to hear back.     Writer will confer with Dr. Vanesa Padilla

## 2020-03-30 DIAGNOSIS — J06.9 URTI (ACUTE UPPER RESPIRATORY INFECTION): Primary | ICD-10-CM

## 2020-03-30 RX ORDER — AZITHROMYCIN 250 MG/1
250 TABLET, FILM COATED ORAL SEE ADMIN INSTRUCTIONS
Qty: 6 TAB | Refills: 0 | Status: SHIPPED | OUTPATIENT
Start: 2020-03-30 | End: 2020-04-04

## 2020-03-30 NOTE — TELEPHONE ENCOUNTER
Per verbal order by Dr. Rut Damian, read back for confirmation, returned Shoaib's call and after verifying HIPAA, advised him that Dr. Rut Damian has prescribed an antibiotic for the pt's cough. Advised him that if the pt develops fever or SOB that she would need to be seen in the ED. He voiced thanks and understanding.

## 2020-03-31 ENCOUNTER — TELEPHONE (OUTPATIENT)
Dept: INTERNAL MEDICINE CLINIC | Age: 40
End: 2020-03-31

## 2020-03-31 NOTE — TELEPHONE ENCOUNTER
----- Message from Yulisa Sanford sent at 3/31/2020  8:05 AM EDT -----  Regarding: DR Barraza/telephone  Contact: [phone]  General Message/Vendor Calls    Caller's first and last name:MD George/      Reason for call: wants to speak with nurse concerning wife      Callback required yes/no and why:yes      Best contact number(s):[phone]      Details to clarify the request:      Yulisa Sanford

## 2020-04-01 ENCOUNTER — VIRTUAL VISIT (OUTPATIENT)
Dept: INTERNAL MEDICINE CLINIC | Age: 40
End: 2020-04-01

## 2020-04-01 VITALS — TEMPERATURE: 98.4 F

## 2020-04-01 DIAGNOSIS — J30.1 ALLERGIC RHINITIS DUE TO POLLEN, UNSPECIFIED SEASONALITY: Primary | ICD-10-CM

## 2020-04-01 DIAGNOSIS — E03.9 HYPOTHYROIDISM, UNSPECIFIED TYPE: ICD-10-CM

## 2020-04-01 DIAGNOSIS — J03.90 ACUTE TONSILLITIS, UNSPECIFIED ETIOLOGY: ICD-10-CM

## 2020-04-01 DIAGNOSIS — R19.7 DIARRHEA, UNSPECIFIED TYPE: ICD-10-CM

## 2020-04-01 DIAGNOSIS — F41.8 DEPRESSION WITH ANXIETY: ICD-10-CM

## 2020-04-01 RX ORDER — AMOXICILLIN 875 MG/1
875 TABLET, FILM COATED ORAL 2 TIMES DAILY
Qty: 14 TAB | Refills: 0 | Status: SHIPPED | OUTPATIENT
Start: 2020-04-01 | End: 2020-06-08 | Stop reason: ALTCHOICE

## 2020-04-01 RX ORDER — MINERAL OIL
180 ENEMA (ML) RECTAL
Qty: 30 TAB | Refills: 1 | Status: SHIPPED | OUTPATIENT
Start: 2020-04-01 | End: 2021-04-01 | Stop reason: SDUPTHER

## 2020-04-01 NOTE — PROGRESS NOTES
Isi Del Valle is a 46 y.o. female who was seen by synchronous (real-time) audio-video technology on 4/1/2020. Consent:  She and/or her healthcare decision maker is aware that this patient-initiated Telehealth encounter is a billable service, with coverage as determined by her insurance carrier. She is aware that she may receive a bill and has provided verbal consent to proceed: Yes    I was at home while conducting this encounter. Patient has been suffering from nasal congestion postnasal drip for last several weeks. Since last night she has been having left-sided tonsillar pain radiating to left ear. Temperature is normal.  She can notice redness of the tonsils with some exudate. Are spiking any fever. No cough yet. No recent history of travel or being in crowded places. She accidentally took NyQuil for sore throat. Noticed to have a moderate diarrhea since this morning. Diarrhea subsided while she is taking Pepto-Bismol. No abdominal pain no blood in the stool. Has recent low thyroid, started on Synthroid. Feeling better. Has depression, stable for now. Taking medicine. All labs reviewed with patient. Assessment & Plan:   Diagnoses and all orders for this visit:    1. Allergic rhinitis due to pollen, unspecified seasonality    Will call in,  -     fexofenadine (ALLEGRA) 180 mg tablet; Take 1 Tab by mouth daily as needed for Allergies. 2. Acute tonsillitis, unspecified etiology  Gargle with salt water or mouthwash. Will change Z-Tone to,  -     amoxicillin (AMOXIL) 875 mg tablet; Take 1 Tab by mouth two (2) times a day. DC Z pack    3. Hypothyroidism, unspecified type  On Synthroid. Will repeat TSH in 6 weeks. 4. Depression with anxiety  Stable. On medicine. 5. Diarrhea, unspecified type    Reassured. Loose stool has stopped since patient has taken Pepto-Bismol. Advised to have Gatorade. Be on brat diet.   If diarrhea get worse, advised to take Imodium 2 mg twice a day as needed. Discussed expected course/resolution/complications of diagnosis in detail with patient. Medication risks/benefits/costs/interactions/alternatives discussed with patient. Pt was given an after visit summary which includes diagnoses, current medications & vitals. Pt expressed understanding with the diagnosis and plan. Coding Help - Use CPT Codes 67283-23317, [id number] for Established and New Patients respectively, either employing EM elements or Time rules. Other codes (example consult codes) may also apply. I spent at least 25 minutes with this established patient, and >50% of the time was spent counseling and/or coordinating care regarding Sore throat,earache, diarrhea and allergy management  712  Subjective: Vanessa Distance was seen for No chief complaint on file. Prior to Admission medications    Medication Sig Start Date End Date Taking? Authorizing Provider   amoxicillin (AMOXIL) 875 mg tablet Take 1 Tab by mouth two (2) times a day. DC Z pack 4/1/20  Yes Azul Barrett MD   fexofenadine (ALLEGRA) 180 mg tablet Take 1 Tab by mouth daily as needed for Allergies. 4/1/20  Yes Azul Barrett MD   azithromycin (ZITHROMAX) 250 mg tablet Take 1 Tab by mouth See Admin Instructions for 5 days. 3/30/20 4/4/20  Azul Barrett MD   levothyroxine (SYNTHROID) 25 mcg tablet Take 1 Tab by mouth Daily (before breakfast). 1/15/20   Azul Barrett MD   fexofenadine (ALLEGRA) 180 mg tablet Take 1 Tab by mouth daily as needed for Allergies. 12/11/19 4/1/20  Azul Barrett MD   amitriptyline (ELAVIL) 10 mg tablet Take 1 Tab by mouth nightly. 9/23/19   Azul Barrett MD   cholecalciferol (VITAMIN D3) 1,000 unit tablet Take 1 Tab by mouth daily. 9/23/19   Azul Barrett MD   lubiPROStone (AMITIZA) 8 mcg capsule Take 1 Cap by mouth daily (with breakfast).  9/23/19   Azul Barrett MD   buPROPion XL (WELLBUTRIN XL) 150 mg tablet TAKE 1 TABLET BY MOUTH EVERY DAY IN THE MORNING 8/19/19   Cayla Bolanos NP   baclofen (LIORESAL) 10 mg tablet Take 1 Tab by mouth two (2) times daily as needed for Pain. 7/15/19   Case Aragon MD   NATURAL DAILY FIBER 3.4 gram/5.8 gram powd  5/22/19   Provider, Historical   diclofenac (VOLTAREN) 1 % gel Apply  to affected area two (2) times daily as needed for Pain. 5/22/19   Case Aragon MD   betamethasone valerate (VALISONE) 0.1 % ointment Apply  to affected area daily. 5/22/19   Case Aragon MD   senna-docusate (PERICOLACE) 8.6-50 mg per tablet Take 1 Tab by mouth daily. 4/22/19   Case Aragon MD     No Known Allergies        ROS  Significant for sore throat, ear pain and loose stool. PHYSICAL EXAMINATION:    Vital Signs: (As obtained by patient/caregiver at home)  Visit Vitals  Temp 98.4 °F (36.9 °C)        Constitutional: [x] Appears well-developed and well-nourished [x] No apparent distress      [] Abnormal -     Mental status: [x] Alert and awake  [x] Oriented to person/place/time [x] Able to follow commands    [] Abnormal -     Eyes:   EOM    [x]  Normal    [] Abnormal -   Sclera  [x]  Normal    [] Abnormal -          Discharge [x]  None visible   [] Abnormal -     HENT: [x] Normocephalic, atraumatic  [] Abnormal - OP erythema  [x] Mouth/Throat: Mucous membranes are moist    External Ears [x] Normal  [] Abnormal -    Neck: [x] No visualized mass [] Abnormal -     Pulmonary/Chest: [x] Respiratory effort normal   [x] No visualized signs of difficulty breathing or respiratory distress        [] Abnormal -                Skin:        [x] No significant exanthematous lesions or discoloration noted on facial skin         [] Abnormal -            Psychiatric:       [x] Normal Affect [] Abnormal -        [x] No Hallucinations    Other pertinent observable physical exam findings:-        We discussed the expected course, resolution and complications of the diagnosis(es) in detail. Medication risks, benefits, costs, interactions, and alternatives were discussed as indicated.   I advised her to contact the office if her condition worsens, changes or fails to improve as anticipated. She expressed understanding with the diagnosis(es) and plan. Pursuant to the emergency declaration under the [facility], [facility] waiver authority and the [employer] Act, this Virtual  Visit was conducted, with patient's consent, to reduce the patient's risk of exposure to COVID-19 and provide continuity of care for an established patient. Services were provided through a video synchronous discussion virtually to substitute for in-person clinic visit.     Lizy Soto MD

## 2020-04-01 NOTE — TELEPHONE ENCOUNTER
Call placed to patient spouse, he was unable to clearly in [address] what his concerns regarding his wife are and asked if Dr Alrfedo Leonard could call him due to language barrier.  Advised spouse that I would confer with provider, spouse voiced understanding

## 2020-06-08 ENCOUNTER — VIRTUAL VISIT (OUTPATIENT)
Dept: INTERNAL MEDICINE CLINIC | Age: 40
End: 2020-06-08

## 2020-06-08 DIAGNOSIS — K64.9 HEMORRHOIDS, UNSPECIFIED HEMORRHOID TYPE: Primary | ICD-10-CM

## 2020-06-08 DIAGNOSIS — E03.9 HYPOTHYROIDISM, UNSPECIFIED TYPE: ICD-10-CM

## 2020-06-08 DIAGNOSIS — F32.1 CURRENT MODERATE EPISODE OF MAJOR DEPRESSIVE DISORDER, UNSPECIFIED WHETHER RECURRENT (HCC): ICD-10-CM

## 2020-06-08 DIAGNOSIS — D64.9 ANEMIA, UNSPECIFIED TYPE: ICD-10-CM

## 2020-06-08 RX ORDER — LEVOTHYROXINE SODIUM 25 UG/1
25 TABLET ORAL
Qty: 30 TAB | Refills: 1 | Status: SHIPPED | OUTPATIENT
Start: 2020-06-08 | End: 2020-09-02 | Stop reason: SDUPTHER

## 2020-06-08 RX ORDER — HYDROCORTISONE 25 MG/G
CREAM TOPICAL 2 TIMES DAILY
Qty: 30 G | Refills: 0 | Status: SHIPPED | OUTPATIENT
Start: 2020-06-08 | End: 2022-09-26 | Stop reason: ALTCHOICE

## 2020-06-08 RX ORDER — BUPROPION HYDROCHLORIDE 150 MG/1
150 TABLET ORAL
Qty: 90 TAB | Refills: 1 | Status: SHIPPED | OUTPATIENT
Start: 2020-06-08 | End: 2021-04-01 | Stop reason: SDUPTHER

## 2020-06-08 NOTE — PROGRESS NOTES
Stewart Zurita is a 46 y.o. female who was seen by synchronous (real-time) audio-video technology on 6/8/2020. Consent: Stewart Zurita, who was seen by synchronous (real-time) audio-video technology, and/or her healthcare decision maker, is aware that this patient-initiated, Telehealth encounter on 6/8/2020 is a billable service, with coverage as determined by her insurance carrier. She is aware that she may receive a bill and has provided verbal consent to proceed: Yes. Assessment & Plan:   Diagnoses and all orders for this visit:    1. Hemorrhoids, unspecified hemorrhoid type    Need high-fiber diet and fluid. Already on hematochezia. She is having bowel movement every day. Will give,  -     hydrocortisone (ANUSOL-HC) 2.5 % rectal cream; Insert  into rectum two (2) times a day. 2. Current moderate episode of major depressive disorder, unspecified whether recurrent (HCC)    Seems stable. Amitriptyline not using anymore. Will refill,  -     buPROPion XL (WELLBUTRIN XL) 150 mg tablet; Take 1 Tab by mouth every morning.  -     CBC WITH AUTOMATED DIFF  -     METABOLIC PANEL, COMPREHENSIVE    3. Hypothyroidism, unspecified type    We will check,  -     TSH 3RD GENERATION  -     levothyroxine (SYNTHROID) 25 mcg tablet; Take 1 Tab by mouth Daily (before breakfast). 4. Anemia, unspecified type    Hemoglobin was low. She had menorrhagia. Advised to have iron rich diet. Will check,  -     IRON PROFILE  -     FERRITIN        I spent at least 25 minutes on this visit with this established patient. Subjective: Stewart Zurita is a 46 y.o. female who was seen for Thyroid Problem and Follow-up    Ms. Terrance Monsalve is doing well. Report occasional blood after she had bowel movement from hemorrhoids. Was seen by rectal surgeon once. No intervention was done. Would like to get a cream for the hemorrhoid. Constipation has improved. She is taking amities every day. Having regular bowel movement.   Has low thyroid, on Synthroid. Need lab work. Need med refill. Depression anxiety seems under control. Taking Wellbutrin regularly. Amitriptyline is stopped. Able to sleep without any problem. Labs reviewed, need new labs. Prior to Admission medications    Medication Sig Start Date End Date Taking? Authorizing Provider   buPROPion XL (WELLBUTRIN XL) 150 mg tablet Take 1 Tab by mouth every morning. 6/8/20  Yes Zulay Goodwin MD   hydrocortisone (ANUSOL-HC) 2.5 % rectal cream Insert  into rectum two (2) times a day. 6/8/20  Yes Zulay Goodwin MD   levothyroxine (SYNTHROID) 25 mcg tablet Take 1 Tab by mouth Daily (before breakfast). 6/8/20  Yes Zulay Goodwin MD   fexofenadine (ALLEGRA) 180 mg tablet Take 1 Tab by mouth daily as needed for Allergies. 4/1/20  Yes Zulay Goodwin MD   cholecalciferol (VITAMIN D3) 1,000 unit tablet Take 1 Tab by mouth daily. 9/23/19  Yes Zulay Goodwin MD   lubiPROStone (AMITIZA) 8 mcg capsule Take 1 Cap by mouth daily (with breakfast). 9/23/19  Yes Zulay Goodwin MD   baclofen (LIORESAL) 10 mg tablet Take 1 Tab by mouth two (2) times daily as needed for Pain. 7/15/19  Yes Zulay Goodwin MD   NATURAL DAILY FIBER 3.4 gram/5.8 gram powd  5/22/19  Yes Provider, Historical   diclofenac (VOLTAREN) 1 % gel Apply  to affected area two (2) times daily as needed for Pain. 5/22/19  Yes Zulay Goodwin MD   betamethasone valerate (VALISONE) 0.1 % ointment Apply  to affected area daily. 5/22/19  Yes Zulay Goodwin MD   amoxicillin (AMOXIL) 875 mg tablet Take 1 Tab by mouth two (2) times a day. DC Z pack 4/1/20 6/8/20  Zulay Goodwin MD   levothyroxine (SYNTHROID) 25 mcg tablet Take 1 Tab by mouth Daily (before breakfast). 1/15/20 6/8/20  Zulay Goodwin MD   amitriptyline (ELAVIL) 10 mg tablet Take 1 Tab by mouth nightly.  9/23/19 6/8/20  Zulay Goodwin MD   buPROPion XL (WELLBUTRIN XL) 150 mg tablet TAKE 1 TABLET BY MOUTH EVERY DAY IN THE MORNING 8/19/19 6/8/20  Georgette Garces NP   senna-docusate (PERICOLACE) 8.6-50 mg per tablet Take 1 Tab by mouth daily. 4/22/19   Shwetha Sanchez MD     No Known Allergies    Past Medical History:   Diagnosis Date    Depression        ROS hemorrhoid,depression    Objective:   Vital Signs: (As obtained by patient/caregiver at home)  There were no vitals taken for this visit. Constitutional: [x] Appears well-developed and well-nourished [x] No apparent distress      [] Abnormal -     Mental status: [x] Alert and awake  [x] Oriented to person/place/time [x] Able to follow commands    [] Abnormal -       Neck: [x] No visualized mass [] Abnormal -     Pulmonary/Chest: [x] Respiratory effort normal   [x] No visualized signs of difficulty breathing or respiratory distress        [] Abnormal -      Musculoskeletal:   [x] Normal gait with no signs of ataxia         [x] Normal range of motion of neck        [] Abnormal -     Neurological:        [x] No Facial Asymmetry (Cranial nerve 7 motor function) (limited exam due to video visit)          [x] No gaze palsy        [] Abnormal -          Skin:        [x] No significant exanthematous lesions or discoloration noted on facial skin         [] Abnormal -            Psychiatric:       [x] Normal Affect [] Abnormal -        [x] No Hallucinations    Other pertinent observable physical exam findings:-        We discussed the expected course, resolution and complications of the diagnosis(es) in detail. Medication risks, benefits, costs, interactions, and alternatives were discussed as indicated. I advised her to contact the office if her condition worsens, changes or fails to improve as anticipated. She expressed understanding with the diagnosis(es) and plan. Shanita Masters is a 46 y.o. female who was evaluated by a video visit encounter for concerns as above. Patient identification was verified prior to start of the visit. A caregiver was present when appropriate.  Due to this being a TeleHealth encounter (During ETGHY-35 public health emergency), evaluation of the following organ systems was limited: Vitals/Constitutional/EENT/Resp/CV/GI//MS/Neuro/Skin/Heme-Lymph-Imm. Pursuant to the emergency declaration under the [address] [place] [address], [facility] waiver authority and the [employer] Act, this Virtual  Visit was conducted, with patient's (and/or legal guardian's) consent, to reduce the patient's risk of exposure to COVID-19 and provide necessary medical care. Services were provided through a video synchronous discussion virtually to substitute for in-person clinic visit. Patient and provider were located at their individual homes.       Yinka Bhagat MD

## 2020-06-08 NOTE — PROGRESS NOTES
Chief Complaint   Patient presents with    Thyroid Problem    Follow-up       1. Have you been to the ER, urgent care clinic since your last visit? Hospitalized since your last visit? no    2. Have you seen or consulted any other health care providers outside of the [address] [place] [address] since your last visit? Include any pap smears or colon screening.   no

## 2020-07-13 ENCOUNTER — OFFICE VISIT (OUTPATIENT)
Dept: INTERNAL MEDICINE CLINIC | Age: 40
End: 2020-07-13

## 2020-07-13 VITALS
DIASTOLIC BLOOD PRESSURE: 78 MMHG | TEMPERATURE: 98.2 F | WEIGHT: 143.4 LBS | SYSTOLIC BLOOD PRESSURE: 126 MMHG | OXYGEN SATURATION: 99 % | HEIGHT: 63 IN | BODY MASS INDEX: 25.41 KG/M2 | HEART RATE: 76 BPM | RESPIRATION RATE: 16 BRPM

## 2020-07-13 DIAGNOSIS — F41.0 PANIC ATTACK: ICD-10-CM

## 2020-07-13 DIAGNOSIS — E03.9 HYPOTHYROIDISM, UNSPECIFIED TYPE: ICD-10-CM

## 2020-07-13 DIAGNOSIS — F41.8 DEPRESSION WITH ANXIETY: ICD-10-CM

## 2020-07-13 DIAGNOSIS — J30.1 ALLERGIC RHINITIS DUE TO POLLEN, UNSPECIFIED SEASONALITY: Primary | ICD-10-CM

## 2020-07-13 DIAGNOSIS — T74.91XA NON-PHYSICAL DOMESTIC ABUSE OF ADULT: ICD-10-CM

## 2020-07-13 RX ORDER — FLUTICASONE PROPIONATE 50 MCG
2 SPRAY, SUSPENSION (ML) NASAL DAILY
Qty: 1 BOTTLE | Refills: 1 | Status: SHIPPED | OUTPATIENT
Start: 2020-07-13 | End: 2021-04-01 | Stop reason: SDUPTHER

## 2020-07-13 RX ORDER — ALPRAZOLAM 0.25 MG/1
0.25 TABLET ORAL
Qty: 20 TAB | Refills: 0 | Status: SHIPPED | OUTPATIENT
Start: 2020-07-13 | End: 2022-05-26 | Stop reason: ALTCHOICE
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
## 2020-07-13 NOTE — PROGRESS NOTES
Health Maintenance Due   Topic Date Due    PAP AKA CERVICAL CYTOLOGY  12/31/2002       No chief complaint on file. 1. Have you been to the ER, urgent care clinic since your last visit? Hospitalized since your last visit? No    2. Have you seen or consulted any other health care providers outside of the [address] [place] [address] since your last visit? Include any pap smears or colon screening. No    3) Do you have an Advance Directive on file? no    4) Are you interested in receiving information on Advance Directives? NO      Patient is accompanied by self I have received verbal consent from SANYA Magallanes to discuss any/all medical information while they are present in the room.

## 2020-07-14 LAB
ALBUMIN SERPL-MCNC: 4.4 G/DL (ref 3.8–4.8)
ALBUMIN/GLOB SERPL: 1.6 {RATIO} (ref 1.2–2.2)
ALP SERPL-CCNC: 63 IU/L (ref 39–117)
ALT SERPL-CCNC: 11 IU/L (ref 0–32)
AST SERPL-CCNC: 17 IU/L (ref 0–40)
BASOPHILS # BLD AUTO: 0.1 X10E3/UL (ref 0–0.2)
BASOPHILS NFR BLD AUTO: 1 %
BILIRUB SERPL-MCNC: 0.3 MG/DL (ref 0–1.2)
BUN SERPL-MCNC: 11 MG/DL (ref 6–20)
BUN/CREAT SERPL: 17 (ref 9–23)
CALCIUM SERPL-MCNC: 9.1 MG/DL (ref 8.7–10.2)
CHLORIDE SERPL-SCNC: 102 MMOL/L (ref 96–106)
CO2 SERPL-SCNC: 20 MMOL/L (ref 20–29)
CREAT SERPL-MCNC: 0.64 MG/DL (ref 0.57–1)
EOSINOPHIL # BLD AUTO: 0.3 X10E3/UL (ref 0–0.4)
EOSINOPHIL NFR BLD AUTO: 4 %
ERYTHROCYTE [DISTWIDTH] IN BLOOD BY AUTOMATED COUNT: 12.7 % (ref 11.7–15.4)
FERRITIN SERPL-MCNC: 24 NG/ML (ref 15–150)
GLOBULIN SER CALC-MCNC: 2.7 G/DL (ref 1.5–4.5)
GLUCOSE SERPL-MCNC: 102 MG/DL (ref 65–99)
HCT VFR BLD AUTO: 37.6 % (ref 34–46.6)
HGB BLD-MCNC: 12.1 G/DL (ref 11.1–15.9)
IMM GRANULOCYTES # BLD AUTO: 0 X10E3/UL (ref 0–0.1)
IMM GRANULOCYTES NFR BLD AUTO: 0 %
IRON SATN MFR SERPL: 13 % (ref 15–55)
IRON SERPL-MCNC: 45 UG/DL (ref 27–159)
LYMPHOCYTES # BLD AUTO: 2.4 X10E3/UL (ref 0.7–3.1)
LYMPHOCYTES NFR BLD AUTO: 31 %
MCH RBC QN AUTO: 28.5 PG (ref 26.6–33)
MCHC RBC AUTO-ENTMCNC: 32.2 G/DL (ref 31.5–35.7)
MCV RBC AUTO: 89 FL (ref 79–97)
MONOCYTES # BLD AUTO: 0.4 X10E3/UL (ref 0.1–0.9)
MONOCYTES NFR BLD AUTO: 5 %
NEUTROPHILS # BLD AUTO: 4.4 X10E3/UL (ref 1.4–7)
NEUTROPHILS NFR BLD AUTO: 59 %
PLATELET # BLD AUTO: 415 X10E3/UL (ref 150–450)
POTASSIUM SERPL-SCNC: 4.4 MMOL/L (ref 3.5–5.2)
PROT SERPL-MCNC: 7.1 G/DL (ref 6–8.5)
RBC # BLD AUTO: 4.25 X10E6/UL (ref 3.77–5.28)
SODIUM SERPL-SCNC: 140 MMOL/L (ref 134–144)
TIBC SERPL-MCNC: 344 UG/DL (ref 250–450)
TSH SERPL DL<=0.005 MIU/L-ACNC: 2.41 UIU/ML (ref 0.45–4.5)
UIBC SERPL-MCNC: 299 UG/DL (ref 131–425)
WBC # BLD AUTO: 7.6 X10E3/UL (ref 3.4–10.8)

## 2020-09-02 ENCOUNTER — VIRTUAL VISIT (OUTPATIENT)
Dept: INTERNAL MEDICINE CLINIC | Age: 40
End: 2020-09-02
Payer: COMMERCIAL

## 2020-09-02 DIAGNOSIS — K59.00 CONSTIPATION, UNSPECIFIED CONSTIPATION TYPE: ICD-10-CM

## 2020-09-02 DIAGNOSIS — K59.09 CONSTIPATION, CHRONIC: ICD-10-CM

## 2020-09-02 DIAGNOSIS — E03.9 HYPOTHYROIDISM, UNSPECIFIED TYPE: ICD-10-CM

## 2020-09-02 DIAGNOSIS — F41.8 DEPRESSION WITH ANXIETY: Primary | ICD-10-CM

## 2020-09-02 DIAGNOSIS — E61.1 IRON DEFICIENCY: ICD-10-CM

## 2020-09-02 PROCEDURE — 99214 OFFICE O/P EST MOD 30 MIN: CPT | Performed by: INTERNAL MEDICINE

## 2020-09-02 RX ORDER — AMOXICILLIN 250 MG
1 CAPSULE ORAL DAILY
Qty: 30 TAB | Refills: 3 | Status: SHIPPED | OUTPATIENT
Start: 2020-09-02 | End: 2022-05-26 | Stop reason: ALTCHOICE

## 2020-09-02 RX ORDER — LUBIPROSTONE 8 UG/1
8 CAPSULE, GELATIN COATED ORAL
Qty: 90 CAP | Refills: 1 | Status: SHIPPED | OUTPATIENT
Start: 2020-09-02 | End: 2021-04-01 | Stop reason: SDUPTHER

## 2020-09-02 RX ORDER — LEVOTHYROXINE SODIUM 25 UG/1
25 TABLET ORAL
Qty: 90 TAB | Refills: 1 | Status: SHIPPED | OUTPATIENT
Start: 2020-09-02 | End: 2021-04-01 | Stop reason: SDUPTHER

## 2020-09-02 RX ORDER — IRON,CARBONYL/ASCORBIC ACID 65MG-125MG
TABLET, DELAYED RELEASE (ENTERIC COATED) ORAL
Qty: 90 TAB | Refills: 0 | Status: SHIPPED | OUTPATIENT
Start: 2020-09-02 | End: 2020-11-23

## 2020-09-02 NOTE — PROGRESS NOTES
Judith Yan is a 46 y.o. female who was seen by synchronous (real-time) audio-video technology on 9/2/2020 for Thyroid Problem; Constipation; and Depression        Assessment & Plan:   Diagnoses and all orders for this visit:    1. Depression with anxiety  Seems stable. Advised to continue Wellbutrin XL for now. 2. Hypothyroidism, unspecified type    TSH normal.  We will continue,  -     levothyroxine (SYNTHROID) 25 mcg tablet; Take 1 Tab by mouth Daily (before breakfast). 3. Constipation, unspecified constipation type    High-fiber diet. Will refill,  -     senna-docusate (PERICOLACE) 8.6-50 mg per tablet; Take 1 Tab by mouth daily. 4. Iron deficiency    Iron level and ferritin level low normal.  Will give iron supplement for 3 months. -     iron,carbonyl-vitamin C (Vitron-C) 65 mg iron- 125 mg TbEC; 1 tab po QD    5. Constipation, chronic  -     lubiPROStone (AMITIZA) 8 mcg capsule; Take 1 Cap by mouth daily (with breakfast). I spent at least 25 minutes on this visit with this established patient. Subjective:   Ms. Imelda Monson is doing better. Started on Wellbutrin XL which is working. She seems less depressed. Is able to sleep at night well. Relationship with her  slightly improved. She mentioned it fluctuates. Has a low thyroid, on Synthroid. Need refill. TSH normal.  Has iron deficiency,  Due to heavy menstruation. Need iron supplement. She has been suffering from chronic constipation for long time. Taking amities here which is helping her. Need refill. All labs reviewed with her, doing well. Prior to Admission medications    Medication Sig Start Date End Date Taking? Authorizing Provider   levothyroxine (SYNTHROID) 25 mcg tablet Take 1 Tab by mouth Daily (before breakfast).  9/2/20  Yes Lynda Moise MD   iron,carbonyl-vitamin C (Vitron-C) 65 mg iron- 125 mg TbEC 1 tab po QD 9/2/20  Yes Lynda Moise MD   senna-docusate (PERICOLACE) 8.6-50 mg per tablet Take 1 Tab by mouth daily. 9/2/20  Yes Edie Lopez MD   lubiPROStone [facility]) 8 mcg capsule Take 1 Cap by mouth daily (with breakfast). 9/2/20  Yes Edie Lopez MD   fluticasone propionate (FLONASE) 50 mcg/actuation nasal spray 2 Sprays by Both Nostrils route daily. 7/13/20  Yes Edie Lopez MD   ALPRAZolam Soraida Cordova) 0.25 mg tablet Take 1 Tab by mouth nightly as needed for Anxiety. Max Daily Amount: 0.25 mg. 7/13/20  Yes Edie Lopez MD   buPROPion XL (WELLBUTRIN XL) 150 mg tablet Take 1 Tab by mouth every morning. 6/8/20  Yes Edie Lopez MD   hydrocortisone (ANUSOL-HC) 2.5 % rectal cream Insert  into rectum two (2) times a day. 6/8/20  Yes Edie Lopez MD   fexofenadine (ALLEGRA) 180 mg tablet Take 1 Tab by mouth daily as needed for Allergies. 4/1/20  Yes Edie Lopez MD   cholecalciferol (VITAMIN D3) 1,000 unit tablet Take 1 Tab by mouth daily. 9/23/19  Yes Edie Lopez MD   NATURAL DAILY FIBER 3.4 gram/5.8 gram powd  5/22/19  Yes Provider, Historical   diclofenac (VOLTAREN) 1 % gel Apply  to affected area two (2) times daily as needed for Pain. 5/22/19  Yes Edie Lopez MD   betamethasone valerate (VALISONE) 0.1 % ointment Apply  to affected area daily. 5/22/19  Yes Edie Lopez MD   levothyroxine (SYNTHROID) 25 mcg tablet Take 1 Tab by mouth Daily (before breakfast). 6/8/20 9/2/20  Edie Lopez MD   lubiPROStone (AMITIZA) 8 mcg capsule Take 1 Cap by mouth daily (with breakfast). 9/23/19 9/2/20  Edie Lopez MD   senna-docusate (PERICOLACE) 8.6-50 mg per tablet Take 1 Tab by mouth daily. 4/22/19 9/2/20  Edie Lopez MD     Past Medical History:   Diagnosis Date    Depression        ROS significant for constipation    Objective:   No flowsheet data found.          Constitutional: [x] Appears well-developed and well-nourished [x] No apparent distress      [] Abnormal -     Mental status: [x] Alert and awake  [x] Oriented to person/place/time [x] Able to follow commands    [] Abnormal -     HENT: [x] Normocephalic, atraumatic  [] Abnormal -   [x] Mouth/Throat: Mucous membranes are moist    External Ears [x] Normal  [] Abnormal -    Neck: [x] No visualized mass [] Abnormal -     Pulmonary/Chest: [x] Respiratory effort normal   [x] No visualized signs of difficulty breathing or respiratory distress        [] Abnormal -      Musculoskeletal:   [x] Normal gait with no signs of ataxia         [x] Normal range of motion of neck        [] Abnormal -     Neurological:        [x] No Facial Asymmetry (Cranial nerve 7 motor function) (limited exam due to video visit)          [x] No gaze palsy        [] Abnormal -          Skin:        [x] No significant exanthematous lesions or discoloration noted on facial skin         [] Abnormal -            Psychiatric:       [x] Normal Affect [] Abnormal -        [x] No Hallucinations  Depression improved. Less anxious. Other pertinent observable physical exam findings:-        We discussed the expected course, resolution and complications of the diagnosis(es) in detail. Medication risks, benefits, costs, interactions, and alternatives were discussed as indicated. I advised her to contact the office if her condition worsens, changes or fails to improve as anticipated. She expressed understanding with the diagnosis(es) and plan. Lisa Johnson, who was evaluated through a patient-initiated, synchronous (real-time) audio-video encounter, and/or her healthcare decision maker, is aware that it is a billable service, with coverage as determined by her insurance carrier. She provided verbal consent to proceed: Yes, and patient identification was verified. It was conducted pursuant to the emergency declaration under the [address] [place] [address], [address] [place] [address] and the [employer] Act. A caregiver was present when appropriate. Ability to conduct physical exam was limited. I was in the office. The patient was at home.       Khoa Veloz Beti Ivory MD

## 2020-09-02 NOTE — PROGRESS NOTES
Health Maintenance Due   Topic Date Due    PAP AKA CERVICAL CYTOLOGY  12/31/2002    Flu Vaccine (1) 09/01/2020       No chief complaint on file. 1. Have you been to the ER, urgent care clinic since your last visit? Hospitalized since your last visit? No    2. Have you seen or consulted any other health care providers outside of the [address] [place] [address] since your last visit? Include any pap smears or colon screening. No    3) Do you have an Advance Directive on file? no    4) Are you interested in receiving information on Advance Directives? NO      Patient is accompanied by  I have received verbal consent from SANYA Magallanes to discuss any/all medical information while they are present in the room.

## 2020-11-21 DIAGNOSIS — E55.9 VITAMIN D DEFICIENCY: ICD-10-CM

## 2020-11-21 DIAGNOSIS — E61.1 IRON DEFICIENCY: ICD-10-CM

## 2020-11-23 RX ORDER — MELATONIN
Qty: 30 TAB | Refills: 2 | Status: SHIPPED | OUTPATIENT
Start: 2020-11-23

## 2020-11-23 RX ORDER — IRON,CARBONYL/ASCORBIC ACID 65MG-125MG
TABLET, DELAYED RELEASE (ENTERIC COATED) ORAL
Qty: 60 TAB | Refills: 1 | Status: SHIPPED | OUTPATIENT
Start: 2020-11-23

## 2021-04-01 ENCOUNTER — OFFICE VISIT (OUTPATIENT)
Dept: INTERNAL MEDICINE CLINIC | Age: 41
End: 2021-04-01
Payer: COMMERCIAL

## 2021-04-01 VITALS
DIASTOLIC BLOOD PRESSURE: 72 MMHG | HEIGHT: 63 IN | RESPIRATION RATE: 16 BRPM | WEIGHT: 143.2 LBS | SYSTOLIC BLOOD PRESSURE: 138 MMHG | OXYGEN SATURATION: 99 % | HEART RATE: 81 BPM | BODY MASS INDEX: 25.37 KG/M2 | TEMPERATURE: 98.1 F

## 2021-04-01 DIAGNOSIS — E03.9 HYPOTHYROIDISM, UNSPECIFIED TYPE: ICD-10-CM

## 2021-04-01 DIAGNOSIS — T30.0 BURN: ICD-10-CM

## 2021-04-01 DIAGNOSIS — M77.8 FOREARM TENDONITIS: ICD-10-CM

## 2021-04-01 DIAGNOSIS — Z12.31 ENCOUNTER FOR SCREENING MAMMOGRAM FOR MALIGNANT NEOPLASM OF BREAST: ICD-10-CM

## 2021-04-01 DIAGNOSIS — F32.1 CURRENT MODERATE EPISODE OF MAJOR DEPRESSIVE DISORDER, UNSPECIFIED WHETHER RECURRENT (HCC): ICD-10-CM

## 2021-04-01 DIAGNOSIS — Z11.59 NEED FOR HEPATITIS C SCREENING TEST: Primary | ICD-10-CM

## 2021-04-01 DIAGNOSIS — K59.09 CONSTIPATION, CHRONIC: ICD-10-CM

## 2021-04-01 DIAGNOSIS — E55.9 VITAMIN D DEFICIENCY: ICD-10-CM

## 2021-04-01 DIAGNOSIS — J30.1 ALLERGIC RHINITIS DUE TO POLLEN, UNSPECIFIED SEASONALITY: ICD-10-CM

## 2021-04-01 DIAGNOSIS — E78.00 PURE HYPERCHOLESTEROLEMIA: ICD-10-CM

## 2021-04-01 DIAGNOSIS — N64.4 BREAST PAIN: ICD-10-CM

## 2021-04-01 PROCEDURE — 99214 OFFICE O/P EST MOD 30 MIN: CPT | Performed by: INTERNAL MEDICINE

## 2021-04-01 RX ORDER — SILVER SULFADIAZINE 10 G/1000G
CREAM TOPICAL DAILY
Qty: 50 G | Refills: 0 | Status: SHIPPED | OUTPATIENT
Start: 2021-04-01

## 2021-04-01 RX ORDER — BUPROPION HYDROCHLORIDE 150 MG/1
150 TABLET ORAL
Qty: 90 TAB | Refills: 1 | Status: SHIPPED | OUTPATIENT
Start: 2021-04-01 | End: 2021-12-02 | Stop reason: SDUPTHER

## 2021-04-01 RX ORDER — LEVOTHYROXINE SODIUM 25 UG/1
25 TABLET ORAL
Qty: 90 TAB | Refills: 1 | Status: SHIPPED | OUTPATIENT
Start: 2021-04-01 | End: 2021-12-02 | Stop reason: SDUPTHER

## 2021-04-01 RX ORDER — FLUTICASONE PROPIONATE 50 MCG
2 SPRAY, SUSPENSION (ML) NASAL DAILY
Qty: 1 BOTTLE | Refills: 1 | Status: SHIPPED | OUTPATIENT
Start: 2021-04-01 | End: 2021-12-02 | Stop reason: SDUPTHER

## 2021-04-01 RX ORDER — MINERAL OIL
180 ENEMA (ML) RECTAL
Qty: 30 TAB | Refills: 1 | Status: SHIPPED | OUTPATIENT
Start: 2021-04-01 | End: 2021-12-02 | Stop reason: ALTCHOICE

## 2021-04-01 RX ORDER — LUBIPROSTONE 8 UG/1
8 CAPSULE, GELATIN COATED ORAL
Qty: 90 CAP | Refills: 1 | Status: SHIPPED | OUTPATIENT
Start: 2021-04-01 | End: 2022-05-26 | Stop reason: ALTCHOICE

## 2021-04-01 RX ORDER — DICLOFENAC SODIUM 10 MG/G
GEL TOPICAL
Qty: 100 G | Refills: 3 | Status: SHIPPED | OUTPATIENT
Start: 2021-04-01 | End: 2022-05-26 | Stop reason: SDUPTHER
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
## 2021-04-01 NOTE — PROGRESS NOTES
Results for orders placed or performed in visit on 06/08/20   CBC WITH AUTOMATED DIFF   Result Value Ref Range    WBC 7.6 3.4 - 10.8 x10E3/uL    RBC 4.25 3.77 - 5.28 x10E6/uL    HGB 12.1 11.1 - 15.9 g/dL    HCT 37.6 34.0 - 46.6 %    MCV 89 79 - 97 fL    MCH 28.5 26.6 - 33.0 pg    MCHC 32.2 31.5 - 35.7 g/dL    RDW 12.7 11.7 - 15.4 %    PLATELET 170 270 - 039 x10E3/uL    NEUTROPHILS 59 Not Estab. %    Lymphocytes 31 Not Estab. %    MONOCYTES 5 Not Estab. %    EOSINOPHILS 4 Not Estab. %    BASOPHILS 1 Not Estab. %    ABS. NEUTROPHILS 4.4 1.4 - 7.0 x10E3/uL    Abs Lymphocytes 2.4 0.7 - 3.1 x10E3/uL    ABS. MONOCYTES 0.4 0.1 - 0.9 x10E3/uL    ABS. EOSINOPHILS 0.3 0.0 - 0.4 x10E3/uL    ABS. BASOPHILS 0.1 0.0 - 0.2 x10E3/uL    IMMATURE GRANULOCYTES 0 Not Estab. %    ABS. IMM. GRANS. 0.0 0.0 - 0.1 J26F3/GH   METABOLIC PANEL, COMPREHENSIVE   Result Value Ref Range    Glucose 102 (H) 65 - 99 mg/dL    BUN 11 6 - 20 mg/dL    Creatinine 0.64 0.57 - 1.00 mg/dL    GFR est non- >59 mL/min/1.73    GFR est  >59 mL/min/1.73    BUN/Creatinine ratio 17 9 - 23    Sodium 140 134 - 144 mmol/L    Potassium 4.4 3.5 - 5.2 mmol/L    Chloride 102 96 - 106 mmol/L    CO2 20 20 - 29 mmol/L    Calcium 9.1 8.7 - 10.2 mg/dL    Protein, total 7.1 6.0 - 8.5 g/dL    Albumin 4.4 3.8 - 4.8 g/dL    GLOBULIN, TOTAL 2.7 1.5 - 4.5 g/dL    A-G Ratio 1.6 1.2 - 2.2    Bilirubin, total 0.3 0.0 - 1.2 mg/dL    Alk.  phosphatase 63 39 - 117 IU/L    AST (SGOT) 17 0 - 40 IU/L    ALT (SGPT) 11 0 - 32 IU/L   TSH 3RD GENERATION   Result Value Ref Range    TSH 2.410 0.450 - 4.500 uIU/mL   IRON PROFILE   Result Value Ref Range    TIBC 344 250 - 450 ug/dL    UIBC 299 131 - 425 ug/dL    Iron 45 27 - 159 ug/dL    Iron % saturation 13 (L) 15 - 55 %   FERRITIN   Result Value Ref Range    Ferritin 24 15 - 150 ng/mL       Health Maintenance Due   Topic Date Due    Hepatitis C Screening  Never done    PAP AKA CERVICAL CYTOLOGY  Never done       Chief Complaint Patient presents with    Neck Pain    Knee Pain     Side of left face       1. Have you been to the ER, urgent care clinic since your last visit? Hospitalized since your last visit? No    2. Have you seen or consulted any other health care providers outside of the [address] [place] [address] since your last visit? Include any pap smears or colon screening. No    3) Do you have an Advance Directive on file? no    4) Are you interested in receiving information on Advance Directives? NO      Patient is accompanied by self I have received verbal consent from SANYA Magallanes to discuss any/all medical information while they are present in the room.

## 2021-04-02 DIAGNOSIS — D72.829 LEUKOCYTOSIS, UNSPECIFIED TYPE: Primary | ICD-10-CM

## 2021-04-02 LAB
25(OH)D3+25(OH)D2 SERPL-MCNC: 17.9 NG/ML (ref 30–100)
ALBUMIN SERPL-MCNC: 4.4 G/DL (ref 3.8–4.8)
ALBUMIN/GLOB SERPL: 1.6 {RATIO} (ref 1.2–2.2)
ALP SERPL-CCNC: 73 IU/L (ref 39–117)
ALT SERPL-CCNC: 14 IU/L (ref 0–32)
AST SERPL-CCNC: 17 IU/L (ref 0–40)
BASOPHILS # BLD AUTO: 0.2 X10E3/UL (ref 0–0.2)
BASOPHILS NFR BLD AUTO: 1 %
BILIRUB SERPL-MCNC: <0.2 MG/DL (ref 0–1.2)
BUN SERPL-MCNC: 11 MG/DL (ref 6–20)
BUN/CREAT SERPL: 21 (ref 9–23)
CALCIUM SERPL-MCNC: 9.1 MG/DL (ref 8.7–10.2)
CHLORIDE SERPL-SCNC: 106 MMOL/L (ref 96–106)
CHOLEST SERPL-MCNC: 184 MG/DL (ref 100–199)
CO2 SERPL-SCNC: 20 MMOL/L (ref 20–29)
CREAT SERPL-MCNC: 0.52 MG/DL (ref 0.57–1)
EOSINOPHIL # BLD AUTO: 1.2 X10E3/UL (ref 0–0.4)
EOSINOPHIL NFR BLD AUTO: 10 %
ERYTHROCYTE [DISTWIDTH] IN BLOOD BY AUTOMATED COUNT: 12.8 % (ref 11.7–15.4)
GLOBULIN SER CALC-MCNC: 2.7 G/DL (ref 1.5–4.5)
GLUCOSE SERPL-MCNC: 110 MG/DL (ref 65–99)
HCT VFR BLD AUTO: 38.7 % (ref 34–46.6)
HCV AB S/CO SERPL IA: <0.1 S/CO RATIO (ref 0–0.9)
HDLC SERPL-MCNC: 50 MG/DL
HGB BLD-MCNC: 12.7 G/DL (ref 11.1–15.9)
IMM GRANULOCYTES # BLD AUTO: 0.1 X10E3/UL (ref 0–0.1)
IMM GRANULOCYTES NFR BLD AUTO: 1 %
IMP & REVIEW OF LAB RESULTS: NORMAL
LDLC SERPL CALC-MCNC: 121 MG/DL (ref 0–99)
LYMPHOCYTES # BLD AUTO: 2.4 X10E3/UL (ref 0.7–3.1)
LYMPHOCYTES NFR BLD AUTO: 21 %
MCH RBC QN AUTO: 28.7 PG (ref 26.6–33)
MCHC RBC AUTO-ENTMCNC: 32.8 G/DL (ref 31.5–35.7)
MCV RBC AUTO: 88 FL (ref 79–97)
MONOCYTES # BLD AUTO: 0.7 X10E3/UL (ref 0.1–0.9)
MONOCYTES NFR BLD AUTO: 7 %
NEUTROPHILS # BLD AUTO: 6.8 X10E3/UL (ref 1.4–7)
NEUTROPHILS NFR BLD AUTO: 60 %
PLATELET # BLD AUTO: 456 X10E3/UL (ref 150–450)
POTASSIUM SERPL-SCNC: 4.2 MMOL/L (ref 3.5–5.2)
PROT SERPL-MCNC: 7.1 G/DL (ref 6–8.5)
RBC # BLD AUTO: 4.42 X10E6/UL (ref 3.77–5.28)
SODIUM SERPL-SCNC: 138 MMOL/L (ref 134–144)
TRIGL SERPL-MCNC: 67 MG/DL (ref 0–149)
TSH SERPL DL<=0.005 MIU/L-ACNC: 2.39 UIU/ML (ref 0.45–4.5)
VLDLC SERPL CALC-MCNC: 13 MG/DL (ref 5–40)
WBC # BLD AUTO: 11.3 X10E3/UL (ref 3.4–10.8)

## 2021-04-02 RX ORDER — ERGOCALCIFEROL 1.25 MG/1
50000 CAPSULE ORAL
Qty: 12 CAP | Refills: 0 | Status: SHIPPED | OUTPATIENT
Start: 2021-04-02 | End: 2021-04-16

## 2021-04-02 NOTE — PROGRESS NOTES
WBC and platelet count mildly elevated. Notify of any s/sx of infection. Repeat CBC in 4-6 weeks. Glucose mildly elevated. Please add [id number]. LDL cholesterol mildly elevated. Recommend that patient watch diet for fatty foods and exercise as tolerated. Vitamin D level is low. Vitamin D 50,000 units weekly x 12 weeks. After completion of 12 weeks, recommend OTC Vitamin D3 1000 units daily. Otherwise, stable labs.

## 2021-04-13 LAB
HBA1C MFR BLD: 6.2 % (ref 4.8–5.6)
SPECIMEN STATUS REPORT, ROLRST: NORMAL

## 2021-04-14 NOTE — PROGRESS NOTES
HgbA1c Is 6.2, within pre-diabetic range. Watch diet for sugars and carbohydrates. Exercise regularly, as tolerated.

## 2021-04-16 RX ORDER — ERGOCALCIFEROL 1.25 MG/1
CAPSULE ORAL
Qty: 12 CAP | Refills: 0 | Status: SHIPPED | OUTPATIENT
Start: 2021-04-16 | End: 2021-12-02 | Stop reason: ALTCHOICE

## 2021-05-07 ENCOUNTER — HOSPITAL ENCOUNTER (EMERGENCY)
Age: 41
Discharge: HOME | End: 2021-05-07
Attending: STUDENT IN AN ORGANIZED HEALTH CARE EDUCATION/TRAINING PROGRAM | Admitting: STUDENT IN AN ORGANIZED HEALTH CARE EDUCATION/TRAINING PROGRAM
Payer: COMMERCIAL

## 2021-05-07 VITALS
OXYGEN SATURATION: 100 % | RESPIRATION RATE: 16 BRPM | TEMPERATURE: 97.2 F | SYSTOLIC BLOOD PRESSURE: 125 MMHG | HEART RATE: 64 BPM | DIASTOLIC BLOOD PRESSURE: 78 MMHG

## 2021-05-07 DIAGNOSIS — R51.9 NONINTRACTABLE HEADACHE, UNSPECIFIED CHRONICITY PATTERN, UNSPECIFIED HEADACHE TYPE: ICD-10-CM

## 2021-05-07 DIAGNOSIS — R53.83 FATIGUE, UNSPECIFIED TYPE: ICD-10-CM

## 2021-05-07 DIAGNOSIS — B34.9 VIRAL SYNDROME: Primary | ICD-10-CM

## 2021-05-07 LAB
ALBUMIN SERPL-MCNC: 4 G/DL (ref 3.5–5)
ALBUMIN/GLOB SERPL: 1.1 {RATIO} (ref 1.1–2.2)
ALP SERPL-CCNC: 70 U/L (ref 45–117)
ALT SERPL-CCNC: 22 U/L (ref 12–78)
ANION GAP SERPL CALC-SCNC: 4 MMOL/L (ref 5–15)
APPEARANCE UR: CLEAR
AST SERPL-CCNC: 30 U/L (ref 15–37)
BACTERIA URNS QL MICRO: NEGATIVE /HPF
BASOPHILS # BLD: 0.1 K/UL (ref 0–0.1)
BASOPHILS NFR BLD: 2 % (ref 0–1)
BILIRUB SERPL-MCNC: 0.4 MG/DL (ref 0.2–1)
BILIRUB UR QL: NEGATIVE
BUN SERPL-MCNC: 7 MG/DL (ref 6–20)
BUN/CREAT SERPL: 13 (ref 12–20)
CALCIUM SERPL-MCNC: 9.2 MG/DL (ref 8.5–10.1)
CHLORIDE SERPL-SCNC: 108 MMOL/L (ref 97–108)
CO2 SERPL-SCNC: 26 MMOL/L (ref 21–32)
COLOR UR: ABNORMAL
COMMENT, HOLDF: NORMAL
CREAT SERPL-MCNC: 0.52 MG/DL (ref 0.55–1.02)
DIFFERENTIAL METHOD BLD: ABNORMAL
EOSINOPHIL # BLD: 0.6 K/UL (ref 0–0.4)
EOSINOPHIL NFR BLD: 8 % (ref 0–7)
EPITH CASTS URNS QL MICRO: ABNORMAL /LPF
ERYTHROCYTE [DISTWIDTH] IN BLOOD BY AUTOMATED COUNT: 13.2 % (ref 11.5–14.5)
GLOBULIN SER CALC-MCNC: 3.8 G/DL (ref 2–4)
GLUCOSE SERPL-MCNC: 114 MG/DL (ref 65–100)
GLUCOSE UR STRIP.AUTO-MCNC: NEGATIVE MG/DL
HCG UR QL: NEGATIVE
HCT VFR BLD AUTO: 38.1 % (ref 35–47)
HGB BLD-MCNC: 12 G/DL (ref 11.5–16)
HGB UR QL STRIP: ABNORMAL
IMM GRANULOCYTES # BLD AUTO: 0 K/UL (ref 0–0.04)
IMM GRANULOCYTES NFR BLD AUTO: 0 % (ref 0–0.5)
KETONES UR QL STRIP.AUTO: NEGATIVE MG/DL
LEUKOCYTE ESTERASE UR QL STRIP.AUTO: ABNORMAL
LYMPHOCYTES # BLD: 2 K/UL (ref 0.8–3.5)
LYMPHOCYTES NFR BLD: 28 % (ref 12–49)
MCH RBC QN AUTO: 28.4 PG (ref 26–34)
MCHC RBC AUTO-ENTMCNC: 31.5 G/DL (ref 30–36.5)
MCV RBC AUTO: 90.1 FL (ref 80–99)
MONOCYTES # BLD: 0.5 K/UL (ref 0–1)
MONOCYTES NFR BLD: 6 % (ref 5–13)
NEUTS SEG # BLD: 4 K/UL (ref 1.8–8)
NEUTS SEG NFR BLD: 56 % (ref 32–75)
NITRITE UR QL STRIP.AUTO: NEGATIVE
NRBC # BLD: 0 K/UL (ref 0–0.01)
NRBC BLD-RTO: 0 PER 100 WBC
PH UR STRIP: 5 [PH] (ref 5–8)
PLATELET # BLD AUTO: 382 K/UL (ref 150–400)
PMV BLD AUTO: 9.7 FL (ref 8.9–12.9)
POTASSIUM SERPL-SCNC: 4.6 MMOL/L (ref 3.5–5.1)
PROT SERPL-MCNC: 7.8 G/DL (ref 6.4–8.2)
PROT UR STRIP-MCNC: NEGATIVE MG/DL
RBC # BLD AUTO: 4.23 M/UL (ref 3.8–5.2)
RBC #/AREA URNS HPF: ABNORMAL /HPF (ref 0–5)
SAMPLES BEING HELD,HOLD: NORMAL
SARS-COV-2, COV2: NORMAL
SARS-COV-2, XPLCVT: NOT DETECTED
SODIUM SERPL-SCNC: 138 MMOL/L (ref 136–145)
SOURCE, COVRS: NORMAL
SP GR UR REFRACTOMETRY: <1.005 (ref 1–1.03)
UR CULT HOLD, URHOLD: NORMAL
UROBILINOGEN UR QL STRIP.AUTO: 0.2 EU/DL (ref 0.2–1)
WBC # BLD AUTO: 7.2 K/UL (ref 3.6–11)
WBC URNS QL MICRO: ABNORMAL /HPF (ref 0–4)

## 2021-05-07 PROCEDURE — 96375 TX/PRO/DX INJ NEW DRUG ADDON: CPT

## 2021-05-07 PROCEDURE — 74011250636 HC RX REV CODE- 250/636: Performed by: NURSE PRACTITIONER

## 2021-05-07 PROCEDURE — 99283 EMERGENCY DEPT VISIT LOW MDM: CPT

## 2021-05-07 PROCEDURE — 80053 COMPREHEN METABOLIC PANEL: CPT

## 2021-05-07 PROCEDURE — 81001 URINALYSIS AUTO W/SCOPE: CPT

## 2021-05-07 PROCEDURE — 85025 COMPLETE CBC W/AUTO DIFF WBC: CPT

## 2021-05-07 PROCEDURE — 96374 THER/PROPH/DIAG INJ IV PUSH: CPT

## 2021-05-07 PROCEDURE — 81025 URINE PREGNANCY TEST: CPT

## 2021-05-07 PROCEDURE — 74011250637 HC RX REV CODE- 250/637: Performed by: NURSE PRACTITIONER

## 2021-05-07 PROCEDURE — 74011000250 HC RX REV CODE- 250: Performed by: NURSE PRACTITIONER

## 2021-05-07 PROCEDURE — U0005 INFEC AGEN DETEC AMPLI PROBE: HCPCS

## 2021-05-07 PROCEDURE — 36415 COLL VENOUS BLD VENIPUNCTURE: CPT

## 2021-05-07 RX ORDER — FAMOTIDINE 20 MG/1
20 TABLET, FILM COATED ORAL 2 TIMES DAILY
Qty: 20 TAB | Refills: 0 | Status: SHIPPED | OUTPATIENT
Start: 2021-05-07 | End: 2021-05-17

## 2021-05-07 RX ORDER — KETOROLAC TROMETHAMINE 30 MG/ML
15 INJECTION, SOLUTION INTRAMUSCULAR; INTRAVENOUS
Status: COMPLETED | OUTPATIENT
Start: 2021-05-07 | End: 2021-05-07

## 2021-05-07 RX ORDER — ONDANSETRON 2 MG/ML
4 INJECTION INTRAMUSCULAR; INTRAVENOUS
Status: COMPLETED | OUTPATIENT
Start: 2021-05-07 | End: 2021-05-07

## 2021-05-07 RX ORDER — NAPROXEN 500 MG/1
500 TABLET ORAL 2 TIMES DAILY WITH MEALS
Qty: 20 TAB | Refills: 0 | Status: SHIPPED | OUTPATIENT
Start: 2021-05-07 | End: 2021-05-17

## 2021-05-07 RX ORDER — ONDANSETRON 4 MG/1
4 TABLET, ORALLY DISINTEGRATING ORAL
Qty: 12 TAB | Refills: 0 | Status: SHIPPED | OUTPATIENT
Start: 2021-05-07 | End: 2021-12-02 | Stop reason: ALTCHOICE

## 2021-05-07 RX ADMIN — KETOROLAC TROMETHAMINE 15 MG: 30 INJECTION, SOLUTION INTRAMUSCULAR; INTRAVENOUS at 09:38

## 2021-05-07 RX ADMIN — SODIUM CHLORIDE 1000 ML: 9 INJECTION, SOLUTION INTRAVENOUS at 09:38

## 2021-05-07 RX ADMIN — ONDANSETRON 4 MG: 2 INJECTION INTRAMUSCULAR; INTRAVENOUS at 09:38

## 2021-05-07 RX ADMIN — ALUMINUM HYDROXIDE, MAGNESIUM HYDROXIDE, AND SIMETHICONE 40 ML: 200; 200; 20 SUSPENSION ORAL at 09:38

## 2021-05-07 NOTE — LETTER
[address] 56 
[address] [facility] [address] [id number] [phone] Work/School Note Date: 5/7/2021 To Whom It May concern: 
 
Roxana Galeano was seen and treated today in the emergency room by the following provider(s): 
Attending Provider: Shy Zeng MD 
Nurse Practitioner: Flakita Moser NP. Roxana Galeano may return to work on 5/10/21. Sincerely, Chano Causey NP

## 2021-05-07 NOTE — ED PROVIDER NOTES
This is a 31-year-old female with past medical history including depression, hypothyroidism, and anemia who presents the ER today for evaluation of several somatic complaints that started yesterday, including generalized weakness/fatigue, nausea, headache, sore throat, body aches, dizziness with standing, and feeling \"hot\". Patient denies any exposure to sick contacts. She has not received a COVID-19 vaccination, nor has she been diagnosed with COVID-19 since the pandemic started. She states that her menstrual cycle started yesterday. She denies any difficulty swallowing, neck or facial swelling, abdominal pain, diarrhea, anosmia/ageusia, pelvic pain, vaginal discharge/bleeding, dysuria, flank pain, objective fever, chest pain, cough, shortness of breath, visual disturbances.            Past Medical History:   Diagnosis Date    Depression        Past Surgical History:   Procedure Laterality Date    HX  SECTION      x2    HX DILATION AND CURETTAGE      HX PELVIC LAPAROSCOPY      HX SALPINGO-OOPHORECTOMY      left ,for ectopic pregnancy         Family History:   Problem Relation Age of Onset    Diabetes Mother     Heart Disease Mother     No Known Problems Father     Heart Disease Sister     Heart Disease Brother        Social History     Socioeconomic History    Marital status:      Spouse name: Not on file    Number of children: Not on file    Years of education: Not on file    Highest education level: Not on file   Occupational History    Not on file   Social Needs    Financial resource strain: Not on file    Food insecurity     Worry: Not on file     Inability: Not on file    Transportation needs     Medical: Not on file     Non-medical: Not on file   Tobacco Use    Smoking status: Never Smoker    Smokeless tobacco: Never Used   Substance and Sexual Activity    Alcohol use: Not Currently     Frequency: Never    Drug use: Never    Sexual activity: Yes     Partners: Male Birth control/protection: Condom     Comment: working full time. 2 children. Lifestyle    Physical activity     Days per week: Not on file     Minutes per session: Not on file    Stress: Not on file   Relationships    Social connections     Talks on phone: Not on file     Gets together: Not on file     Attends Buddhist service: Not on file     Active member of club or organization: Not on file     Attends meetings of clubs or organizations: Not on file     Relationship status: Not on file    Intimate partner violence     Fear of current or ex partner: Not on file     Emotionally abused: Not on file     Physically abused: Not on file     Forced sexual activity: Not on file   Other Topics Concern    Not on file   Social History Narrative    ** Merged History Encounter **              ALLERGIES: Patient has no known allergies. Review of Systems   Constitutional: Positive for chills. Negative for fever. HENT: Positive for sore throat. Negative for trouble swallowing and voice change. Eyes: Negative for visual disturbance. Respiratory: Negative for cough, chest tightness and shortness of breath. Cardiovascular: Negative for chest pain, palpitations and leg swelling. Gastrointestinal: Positive for nausea. Negative for abdominal distention, abdominal pain and vomiting. Genitourinary: Negative for dysuria, flank pain, pelvic pain and vaginal discharge. Musculoskeletal: Positive for myalgias. Skin: Negative for rash. Neurological: Positive for dizziness and headaches. Psychiatric/Behavioral: Negative for dysphoric mood. Vitals:    05/07/21 0829   BP: 125/78   Pulse: 64   Resp: 16   Temp: 97.2 °F (36.2 °C)   SpO2: 100%            Physical Exam  Vitals signs and nursing note reviewed. Constitutional:       General: She is not in acute distress. Appearance: Normal appearance. She is not ill-appearing.       Comments: Well-appearing, no acute distress   HENT:      Head: Normocephalic and atraumatic. Right Ear: External ear normal.      Left Ear: External ear normal.      Nose: Nose normal.      Mouth/Throat:      Mouth: Mucous membranes are moist.      Pharynx: Oropharynx is clear. Uvula midline. No uvula swelling. Tonsils: No tonsillar abscesses. Comments: Mild erythema to posterior oropharynx  Eyes:      General: No scleral icterus. Extraocular Movements: Extraocular movements intact. Conjunctiva/sclera: Conjunctivae normal.      Pupils: Pupils are equal, round, and reactive to light. Neck:      Musculoskeletal: Normal range of motion and neck supple. No neck rigidity. Thyroid: No thyroid tenderness. Trachea: Trachea and phonation normal.   Cardiovascular:      Rate and Rhythm: Normal rate and regular rhythm. Pulses: Normal pulses. Radial pulses are 2+ on the right side and 2+ on the left side. Heart sounds: Normal heart sounds. Pulmonary:      Effort: Pulmonary effort is normal. No tachypnea or respiratory distress. Breath sounds: Normal breath sounds and air entry. Abdominal:      General: Abdomen is flat. Bowel sounds are normal. There is no distension. Palpations: Abdomen is soft. Tenderness: There is no abdominal tenderness. There is no right CVA tenderness or left CVA tenderness. Musculoskeletal: Normal range of motion. Right lower leg: No edema. Left lower leg: No edema. Lymphadenopathy:      Cervical: No cervical adenopathy. Skin:     General: Skin is warm and dry. Coloration: Skin is not pale. Neurological:      General: No focal deficit present. Mental Status: She is alert and oriented to person, place, and time. Psychiatric:         Mood and Affect: Mood normal.         Behavior: Behavior normal.         Thought Content:  Thought content normal.         Judgment: Judgment normal.          MDM  Number of Diagnoses or Management Options        VITAL SIGNS:  Patient Vitals for the past 4 hrs:   Temp Pulse Resp BP SpO2   05/07/21 0829 97.2 °F (36.2 °C) 64 16 125/78 100 %         LABS:  Recent Results (from the past 6 hour(s))   CBC WITH AUTOMATED DIFF    Collection Time: 05/07/21  8:48 AM   Result Value Ref Range    WBC 7.2 3.6 - 11.0 K/uL    RBC 4.23 3.80 - 5.20 M/uL    HGB 12.0 11.5 - 16.0 g/dL    HCT 38.1 35.0 - 47.0 %    MCV 90.1 80.0 - 99.0 FL    MCH 28.4 26.0 - 34.0 PG    MCHC 31.5 30.0 - 36.5 g/dL    RDW 13.2 11.5 - 14.5 %    PLATELET 217 217 - 971 K/uL    MPV 9.7 8.9 - 12.9 FL    NRBC 0.0 0  WBC    ABSOLUTE NRBC 0.00 0.00 - 0.01 K/uL    NEUTROPHILS 56 32 - 75 %    LYMPHOCYTES 28 12 - 49 %    MONOCYTES 6 5 - 13 %    EOSINOPHILS 8 (H) 0 - 7 %    BASOPHILS 2 (H) 0 - 1 %    IMMATURE GRANULOCYTES 0 0.0 - 0.5 %    ABS. NEUTROPHILS 4.0 1.8 - 8.0 K/UL    ABS. LYMPHOCYTES 2.0 0.8 - 3.5 K/UL    ABS. MONOCYTES 0.5 0.0 - 1.0 K/UL    ABS. EOSINOPHILS 0.6 (H) 0.0 - 0.4 K/UL    ABS. BASOPHILS 0.1 0.0 - 0.1 K/UL    ABS. IMM. GRANS. 0.0 0.00 - 0.04 K/UL    DF AUTOMATED     METABOLIC PANEL, COMPREHENSIVE    Collection Time: 05/07/21  8:48 AM   Result Value Ref Range    Sodium 138 136 - 145 mmol/L    Potassium 4.6 3.5 - 5.1 mmol/L    Chloride 108 97 - 108 mmol/L    CO2 26 21 - 32 mmol/L    Anion gap 4 (L) 5 - 15 mmol/L    Glucose 114 (H) 65 - 100 mg/dL    BUN 7 6 - 20 MG/DL    Creatinine 0.52 (L) 0.55 - 1.02 MG/DL    BUN/Creatinine ratio 13 12 - 20      GFR est AA >60 >60 ml/min/1.73m2    GFR est non-AA >60 >60 ml/min/1.73m2    Calcium 9.2 8.5 - 10.1 MG/DL    Bilirubin, total 0.4 0.2 - 1.0 MG/DL    ALT (SGPT) 22 12 - 78 U/L    AST (SGOT) 30 15 - 37 U/L    Alk.  phosphatase 70 45 - 117 U/L    Protein, total 7.8 6.4 - 8.2 g/dL    Albumin 4.0 3.5 - 5.0 g/dL    Globulin 3.8 2.0 - 4.0 g/dL    A-G Ratio 1.1 1.1 - 2.2     URINE CULTURE HOLD SAMPLE    Collection Time: 05/07/21  8:48 AM    Specimen: Serum; Urine   Result Value Ref Range    Urine culture hold        Urine on hold in Microbiology dept for 2 days.  If unpreserved urine is submitted, it cannot be used for addtional testing after 24 hours, recollection will be required. SAMPLES BEING HELD    Collection Time: 05/07/21  8:48 AM   Result Value Ref Range    SAMPLES BEING HELD 1RED     COMMENT        Add-on orders for these samples will be processed based on acceptable specimen integrity and analyte stability, which may vary by analyte. HCG URINE, QL. - POC    Collection Time: 05/07/21  9:05 AM   Result Value Ref Range    Pregnancy test,urine (POC) Negative NEG     URINALYSIS W/MICROSCOPIC    Collection Time: 05/07/21  9:10 AM   Result Value Ref Range    Color YELLOW/STRAW      Appearance CLEAR CLEAR      Specific gravity <1.005 1.003 - 1.030    pH (UA) 5.0 5.0 - 8.0      Protein Negative NEG mg/dL    Glucose Negative NEG mg/dL    Ketone Negative NEG mg/dL    Bilirubin Negative NEG      Blood LARGE (A) NEG      Urobilinogen 0.2 0.2 - 1.0 EU/dL    Nitrites Negative NEG      Leukocyte Esterase TRACE (A) NEG      WBC 0-4 0 - 4 /hpf    RBC 0-5 0 - 5 /hpf    Epithelial cells FEW FEW /lpf    Bacteria Negative NEG /hpf   SARS-COV-2    Collection Time: 05/07/21  9:43 AM   Result Value Ref Range    SARS-CoV-2 Please find results under separate order          IMAGING:  No orders to display         Medications During Visit:  Medications   sodium chloride 0.9 % bolus infusion 1,000 mL (1,000 mL IntraVENous New Bag 5/7/21 0938)   mylanta/viscous lidocaine (GI COCKTAIL) (40 mL Oral Given 5/7/21 0938)   ondansetron (ZOFRAN) injection 4 mg (4 mg IntraVENous Given 5/7/21 0938)   ketorolac (TORADOL) injection 15 mg (15 mg IntraVENous Given 5/7/21 0938)         DECISION MAKING:  Yahaira Mesa is a 45 y.o. female who comes in as above. Symptoms improving significantly after GI cocktail, Toradol, IVF, and Zofran. Labs and urine unremarkable. Suspect viral syndrome, possibly COVID-19; test pending at time of disposition.     Plan:  Zofran, NSAIDs, and Pepcid for symptom control. Follow-up with PCP for further management. Return precautions for worsening symptoms. The clinical decision making for this encounter included ordering and interpreting the above diagnostic tests with comparison to prior studies that are within our EMR. Past medical and surgical histories were reviewed, as were records from recent outpatient and emergency department visits. The above results discussed and reviewed with the patient. Patient verbalized understanding of the care plan, including any changes to current outpatient medication regimen, discussed disease process, symptom control, and follow-up care. Return precautions reviewed. IMPRESSION:  1. Viral syndrome    2. Fatigue, unspecified type    3. Nonintractable headache, unspecified chronicity pattern, unspecified headache type        DISPOSITION:  Discharged      Current Discharge Medication List      START taking these medications    Details   ondansetron (ZOFRAN ODT) 4 mg disintegrating tablet Take 1 Tab by mouth every eight (8) hours as needed for Nausea or Vomiting. Qty: 12 Tab, Refills: 0  Start date: 5/7/2021      naproxen (Naprosyn) 500 mg tablet Take 1 Tab by mouth two (2) times daily (with meals) for 10 days. Qty: 20 Tab, Refills: 0  Start date: 5/7/2021, End date: 5/17/2021              Follow-up Information     Follow up With Specialties Details Why Contact Eber Flores MD Internal Medicine  As needed [address]  [address] [facility]  [phone]              The patient is asked to follow-up with their primary care provider in the next several days. They are to call tomorrow for an appointment. The patient is asked to return promptly for any increased concerns or worsening of symptoms. They can return to this emergency department or any other emergency department.

## 2021-05-07 NOTE — ED NOTES
Kobi Klein NP gave and reviewed discharge instructions with patient. Patient verbalizes understanding of discharge instructions. Pt alert and oriented, appears in no acute distress, respirations equal and unlabored. Ambulatory upon discharge with steady gait.

## 2021-05-12 ENCOUNTER — HOSPITAL ENCOUNTER (OUTPATIENT)
Dept: MAMMOGRAPHY | Age: 41
Discharge: HOME | End: 2021-05-12
Attending: INTERNAL MEDICINE
Payer: COMMERCIAL

## 2021-05-12 DIAGNOSIS — Z12.31 ENCOUNTER FOR SCREENING MAMMOGRAM FOR MALIGNANT NEOPLASM OF BREAST: ICD-10-CM

## 2021-05-12 DIAGNOSIS — N64.4 BREAST PAIN: ICD-10-CM

## 2021-05-12 PROCEDURE — 77067 SCR MAMMO BI INCL CAD: CPT

## 2021-12-02 ENCOUNTER — OFFICE VISIT (OUTPATIENT)
Dept: INTERNAL MEDICINE CLINIC | Age: 41
End: 2021-12-02
Payer: COMMERCIAL

## 2021-12-02 VITALS
OXYGEN SATURATION: 99 % | SYSTOLIC BLOOD PRESSURE: 124 MMHG | RESPIRATION RATE: 16 BRPM | DIASTOLIC BLOOD PRESSURE: 76 MMHG | HEART RATE: 89 BPM | HEIGHT: 63 IN | TEMPERATURE: 98.3 F | BODY MASS INDEX: 26.75 KG/M2 | WEIGHT: 151 LBS

## 2021-12-02 DIAGNOSIS — E55.9 VITAMIN D DEFICIENCY: ICD-10-CM

## 2021-12-02 DIAGNOSIS — R73.03 PREDIABETES: ICD-10-CM

## 2021-12-02 DIAGNOSIS — M62.838 NECK MUSCLE SPASM: Primary | ICD-10-CM

## 2021-12-02 DIAGNOSIS — Z23 NEEDS FLU SHOT: ICD-10-CM

## 2021-12-02 DIAGNOSIS — F32.1 CURRENT MODERATE EPISODE OF MAJOR DEPRESSIVE DISORDER, UNSPECIFIED WHETHER RECURRENT (HCC): ICD-10-CM

## 2021-12-02 DIAGNOSIS — J30.1 ALLERGIC RHINITIS DUE TO POLLEN, UNSPECIFIED SEASONALITY: ICD-10-CM

## 2021-12-02 DIAGNOSIS — E03.9 HYPOTHYROIDISM, UNSPECIFIED TYPE: ICD-10-CM

## 2021-12-02 PROCEDURE — 99214 OFFICE O/P EST MOD 30 MIN: CPT | Performed by: INTERNAL MEDICINE

## 2021-12-02 PROCEDURE — 90686 IIV4 VACC NO PRSV 0.5 ML IM: CPT | Performed by: INTERNAL MEDICINE

## 2021-12-02 RX ORDER — LEVOTHYROXINE SODIUM 25 UG/1
25 TABLET ORAL
Qty: 90 TABLET | Refills: 1 | Status: SHIPPED | OUTPATIENT
Start: 2021-12-02 | End: 2022-05-26 | Stop reason: SDUPTHER

## 2021-12-02 RX ORDER — BUPROPION HYDROCHLORIDE 150 MG/1
150 TABLET ORAL
Qty: 90 TABLET | Refills: 1 | Status: SHIPPED | OUTPATIENT
Start: 2021-12-02 | End: 2022-05-26 | Stop reason: SDUPTHER

## 2021-12-02 RX ORDER — TIZANIDINE 2 MG/1
2 TABLET ORAL
Qty: 20 TABLET | Refills: 0 | Status: SHIPPED | OUTPATIENT
Start: 2021-12-02 | End: 2022-05-26 | Stop reason: ALTCHOICE

## 2021-12-02 RX ORDER — FLUTICASONE PROPIONATE 50 MCG
2 SPRAY, SUSPENSION (ML) NASAL DAILY
Qty: 1 EACH | Refills: 3 | Status: SHIPPED | OUTPATIENT
Start: 2021-12-02 | End: 2022-05-26 | Stop reason: SDUPTHER

## 2021-12-02 RX ORDER — LEVOCETIRIZINE DIHYDROCHLORIDE 5 MG/1
5 TABLET, FILM COATED ORAL
Qty: 30 TABLET | Refills: 1 | Status: SHIPPED | OUTPATIENT
Start: 2021-12-02 | End: 2022-05-26 | Stop reason: ALTCHOICE
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
## 2021-12-02 NOTE — PROGRESS NOTES
Joseph Bragg is a 45 y.o. female  who presents for routine immunization(s). Patient denies any symptoms , reactions or allergies that would exclude them from being immunized today. Risks and adverse reactions were discussed. The patient/caregiver was provided the VIS and allotted time to read and ask questions prior to administration of vaccine. Patient voiced full understanding and signed Adult Immunization Consent form. All questions were addressed. Patient was observed for 10 min post injection. There were no reactions observed. Results for orders placed or performed during the hospital encounter of 05/07/21   URINE CULTURE HOLD SAMPLE    Specimen: Serum; Urine   Result Value Ref Range    Urine culture hold        Urine on hold in Microbiology dept for 2 days. If unpreserved urine is submitted, it cannot be used for addtional testing after 24 hours, recollection will be required. CBC WITH AUTOMATED DIFF   Result Value Ref Range    WBC 7.2 3.6 - 11.0 K/uL    RBC 4.23 3.80 - 5.20 M/uL    HGB 12.0 11.5 - 16.0 g/dL    HCT 38.1 35.0 - 47.0 %    MCV 90.1 80.0 - 99.0 FL    MCH 28.4 26.0 - 34.0 PG    MCHC 31.5 30.0 - 36.5 g/dL    RDW 13.2 11.5 - 14.5 %    PLATELET 492 563 - 974 K/uL    MPV 9.7 8.9 - 12.9 FL    NRBC 0.0 0  WBC    ABSOLUTE NRBC 0.00 0.00 - 0.01 K/uL    NEUTROPHILS 56 32 - 75 %    LYMPHOCYTES 28 12 - 49 %    MONOCYTES 6 5 - 13 %    EOSINOPHILS 8 (H) 0 - 7 %    BASOPHILS 2 (H) 0 - 1 %    IMMATURE GRANULOCYTES 0 0.0 - 0.5 %    ABS. NEUTROPHILS 4.0 1.8 - 8.0 K/UL    ABS. LYMPHOCYTES 2.0 0.8 - 3.5 K/UL    ABS. MONOCYTES 0.5 0.0 - 1.0 K/UL    ABS. EOSINOPHILS 0.6 (H) 0.0 - 0.4 K/UL    ABS. BASOPHILS 0.1 0.0 - 0.1 K/UL    ABS. IMM.  GRANS. 0.0 0.00 - 0.04 K/UL    DF AUTOMATED     METABOLIC PANEL, COMPREHENSIVE   Result Value Ref Range    Sodium 138 136 - 145 mmol/L    Potassium 4.6 3.5 - 5.1 mmol/L    Chloride 108 97 - 108 mmol/L    CO2 26 21 - 32 mmol/L    Anion gap 4 (L) 5 - 15 mmol/L Glucose 114 (H) 65 - 100 mg/dL    BUN 7 6 - 20 MG/DL    Creatinine 0.52 (L) 0.55 - 1.02 MG/DL    BUN/Creatinine ratio 13 12 - 20      GFR est AA >60 >60 ml/min/1.73m2    GFR est non-AA >60 >60 ml/min/1.73m2    Calcium 9.2 8.5 - 10.1 MG/DL    Bilirubin, total 0.4 0.2 - 1.0 MG/DL    ALT (SGPT) 22 12 - 78 U/L    AST (SGOT) 30 15 - 37 U/L    Alk. phosphatase 70 45 - 117 U/L    Protein, total 7.8 6.4 - 8.2 g/dL    Albumin 4.0 3.5 - 5.0 g/dL    Globulin 3.8 2.0 - 4.0 g/dL    A-G Ratio 1.1 1.1 - 2.2     URINALYSIS W/MICROSCOPIC   Result Value Ref Range    Color YELLOW/STRAW      Appearance CLEAR CLEAR      Specific gravity <1.005 1.003 - 1.030    pH (UA) 5.0 5.0 - 8.0      Protein Negative NEG mg/dL    Glucose Negative NEG mg/dL    Ketone Negative NEG mg/dL    Bilirubin Negative NEG      Blood LARGE (A) NEG      Urobilinogen 0.2 0.2 - 1.0 EU/dL    Nitrites Negative NEG      Leukocyte Esterase TRACE (A) NEG      WBC 0-4 0 - 4 /hpf    RBC 0-5 0 - 5 /hpf    Epithelial cells FEW FEW /lpf    Bacteria Negative NEG /hpf   SARS-COV-2   Result Value Ref Range    SARS-CoV-2 Please find results under separate order     SAMPLES BEING HELD   Result Value Ref Range    SAMPLES BEING HELD 1RED     COMMENT        Add-on orders for these samples will be processed based on acceptable specimen integrity and analyte stability, which may vary by analyte.    SARS-COV-2   Result Value Ref Range    Specimen source Nasopharyngeal      SARS-CoV-2 Not detected NOTD     HCG URINE, QL. - POC   Result Value Ref Range    Pregnancy test,urine (POC) Negative NEG

## 2021-12-02 NOTE — PROGRESS NOTES
Health Maintenance Due   Topic Date Due    Cervical cancer screen  Never done    Flu Vaccine (1) 09/01/2021       Chief Complaint   Patient presents with    Neck Pain    Hypertension    Chest Pain    Insomnia       1. Have you been to the ER, urgent care clinic since your last visit? Hospitalized since your last visit? No    2. Have you seen or consulted any other health care providers outside of the [address] [place] [address] since your last visit? Include any pap smears or colon screening. No    3) Do you have an Advance Directive on file? no    4) Are you interested in receiving information on Advance Directives? NO      Patient is accompanied by  I have received verbal consent from SANYA Magallanes to discuss any/all medical information while they are present in the room.

## 2021-12-02 NOTE — PATIENT INSTRUCTIONS
Neck Spasm: Exercises  Introduction  Here are some examples of exercises for you to try. The exercises may be suggested for a condition or for rehabilitation. Start each exercise slowly. Ease off the exercises if you start to have pain. You will be told when to start these exercises and which ones will work best for you. How to do the exercises  Levator scapula stretch    1. Sit in a firm chair, or stand up straight. 2. Gently tilt your head toward your left shoulder. 3. Turn your head to look down into your armpit, bending your head slightly forward. Let the weight of your head stretch your neck muscles. 4. Hold for 15 to 30 seconds. 5. Return to your starting position. 6. Follow the same instructions above, but tilt your head toward your right shoulder. 7. Repeat 2 to 4 times toward each shoulder. Upper trapezius stretch    1. Sit in a firm chair, or stand up straight. 2. This stretch works best if you keep your shoulder down as you lean away from it. To help you remember to do this, start by relaxing your shoulders and lightly holding on to your thighs or your chair. 3. Tilt your head toward your shoulder and hold for 15 to 30 seconds. Let the weight of your head stretch your muscles. 4. If you would like a little added stretch, place your arm behind your back. Use the arm opposite of the direction you are tilting your head. For example, if you are tilting your head to the left, place your right arm behind your back. 5. Repeat 2 to 4 times toward each shoulder. Neck rotation    1. Sit in a firm chair, or stand up straight. 2. Keeping your chin level, turn your head to the right, and hold for 15 to 30 seconds. 3. Turn your head to the left, and hold for 15 to 30 seconds. 4. Repeat 2 to 4 times to each side. Chin tuck    1. Lie on the floor with a rolled-up towel under your neck. Your head should be touching the floor. 2. Slowly bring your chin toward the front of your neck.   3. Hold for a count of 6, and then relax for up to 10 seconds. 4. Repeat 8 to 12 times. Forward neck flexion    1. Sit in a firm chair, or stand up straight. 2. Bend your head forward. 3. Hold for 15 to 30 seconds, then return to your starting position. 4. Repeat 2 to 4 times. Follow-up care is a key part of your treatment and safety. Be sure to make and go to all appointments, and call your doctor if you are having problems. It's also a good idea to know your test results and keep a list of the medicines you take. Where can you learn more? Go to http://www.gray.com/  Enter P962 in the search box to learn more about \"Neck Spasm: Exercises. \"  Current as of: July 1, 2021               Content Version: 13.0  © 8471-6636 [employer]. Care instructions adapted under license by [employer] (which disclaims liability or warranty for this information). If you have questions about a medical condition or this instruction, always ask your healthcare professional. [address] any warranty or liability for your use of this information.

## 2021-12-03 LAB
25(OH)D3+25(OH)D2 SERPL-MCNC: 18.7 NG/ML (ref 30–100)
ALBUMIN SERPL-MCNC: 4.5 G/DL (ref 3.8–4.8)
ALBUMIN/GLOB SERPL: 1.7 {RATIO} (ref 1.2–2.2)
ALP SERPL-CCNC: 77 IU/L (ref 44–121)
ALT SERPL-CCNC: 14 IU/L (ref 0–32)
AST SERPL-CCNC: 21 IU/L (ref 0–40)
BILIRUB SERPL-MCNC: 0.4 MG/DL (ref 0–1.2)
BUN SERPL-MCNC: 9 MG/DL (ref 6–20)
BUN/CREAT SERPL: 16 (ref 9–23)
CALCIUM SERPL-MCNC: 8.8 MG/DL (ref 8.7–10.2)
CHLORIDE SERPL-SCNC: 101 MMOL/L (ref 96–106)
CO2 SERPL-SCNC: 17 MMOL/L (ref 20–29)
CREAT SERPL-MCNC: 0.56 MG/DL (ref 0.57–1)
EST. AVERAGE GLUCOSE BLD GHB EST-MCNC: 146 MG/DL
GLOBULIN SER CALC-MCNC: 2.7 G/DL (ref 1.5–4.5)
GLUCOSE SERPL-MCNC: 104 MG/DL (ref 65–99)
HBA1C MFR BLD: 6.7 % (ref 4.8–5.6)
POTASSIUM SERPL-SCNC: 4.3 MMOL/L (ref 3.5–5.2)
PROT SERPL-MCNC: 7.2 G/DL (ref 6–8.5)
SODIUM SERPL-SCNC: 136 MMOL/L (ref 134–144)
TSH SERPL DL<=0.005 MIU/L-ACNC: 2.01 UIU/ML (ref 0.45–4.5)

## 2021-12-03 RX ORDER — ERGOCALCIFEROL 1.25 MG/1
50000 CAPSULE ORAL
Qty: 12 CAPSULE | Refills: 0 | Status: SHIPPED | OUTPATIENT
Start: 2021-12-03 | End: 2022-02-19

## 2021-12-03 NOTE — PROGRESS NOTES
Vitamin D is low. Vitamin D 50,000 units weekly x 12 weeks. After completion of 12 weeks, recommend OTC Vitamin D3 1000 units daily. HgBA1c more elevated, 6.7. Watch diet for foods high in sugar. Recommend regular exercise, as tolerated.

## 2022-04-01 DIAGNOSIS — J30.1 ALLERGIC RHINITIS DUE TO POLLEN, UNSPECIFIED SEASONALITY: ICD-10-CM

## 2022-04-04 RX ORDER — MINERAL OIL
180 ENEMA (ML) RECTAL
Qty: 30 TABLET | Refills: 1 | Status: SHIPPED | OUTPATIENT
Start: 2022-04-04 | End: 2022-05-26 | Stop reason: SDUPTHER

## 2022-05-26 ENCOUNTER — OFFICE VISIT (OUTPATIENT)
Dept: INTERNAL MEDICINE CLINIC | Age: 42
End: 2022-05-26
Attending: INTERNAL MEDICINE
Payer: COMMERCIAL

## 2022-05-26 ENCOUNTER — HOSPITAL ENCOUNTER (OUTPATIENT)
Dept: MAMMOGRAPHY | Age: 42
Discharge: HOME | End: 2022-05-26
Attending: INTERNAL MEDICINE
Payer: COMMERCIAL

## 2022-05-26 VITALS
DIASTOLIC BLOOD PRESSURE: 82 MMHG | SYSTOLIC BLOOD PRESSURE: 130 MMHG | BODY MASS INDEX: 26.61 KG/M2 | OXYGEN SATURATION: 98 % | TEMPERATURE: 98.1 F | HEART RATE: 87 BPM | RESPIRATION RATE: 16 BRPM | HEIGHT: 63 IN | WEIGHT: 150.2 LBS

## 2022-05-26 DIAGNOSIS — L30.9 DERMATITIS: ICD-10-CM

## 2022-05-26 DIAGNOSIS — Z12.31 ENCOUNTER FOR SCREENING MAMMOGRAM FOR MALIGNANT NEOPLASM OF BREAST: ICD-10-CM

## 2022-05-26 DIAGNOSIS — A09 TRAVELERS' DIARRHEA: ICD-10-CM

## 2022-05-26 DIAGNOSIS — E55.9 VITAMIN D DEFICIENCY: ICD-10-CM

## 2022-05-26 DIAGNOSIS — F32.1 CURRENT MODERATE EPISODE OF MAJOR DEPRESSIVE DISORDER, UNSPECIFIED WHETHER RECURRENT (HCC): ICD-10-CM

## 2022-05-26 DIAGNOSIS — E03.9 HYPOTHYROIDISM, UNSPECIFIED TYPE: ICD-10-CM

## 2022-05-26 DIAGNOSIS — J30.1 ALLERGIC RHINITIS DUE TO POLLEN, UNSPECIFIED SEASONALITY: ICD-10-CM

## 2022-05-26 DIAGNOSIS — M62.838 NECK MUSCLE SPASM: ICD-10-CM

## 2022-05-26 DIAGNOSIS — M77.8 FOREARM TENDONITIS: ICD-10-CM

## 2022-05-26 DIAGNOSIS — E11.9 TYPE 2 DIABETES MELLITUS WITHOUT COMPLICATION, WITHOUT LONG-TERM CURRENT USE OF INSULIN (HCC): Primary | ICD-10-CM

## 2022-05-26 PROCEDURE — 99214 OFFICE O/P EST MOD 30 MIN: CPT | Performed by: INTERNAL MEDICINE

## 2022-05-26 PROCEDURE — 77067 SCR MAMMO BI INCL CAD: CPT

## 2022-05-26 RX ORDER — CIPROFLOXACIN 500 MG/1
500 TABLET ORAL 2 TIMES DAILY
Qty: 14 TABLET | Refills: 0 | Status: SHIPPED | OUTPATIENT
Start: 2022-05-26

## 2022-05-26 RX ORDER — BUPROPION HYDROCHLORIDE 150 MG/1
150 TABLET ORAL
Qty: 90 TABLET | Refills: 1 | Status: SHIPPED | OUTPATIENT
Start: 2022-05-26 | End: 2022-09-26 | Stop reason: DRUGHIGH

## 2022-05-26 RX ORDER — TIZANIDINE 2 MG/1
2 TABLET ORAL
Qty: 20 TABLET | Refills: 0 | Status: CANCELLED | OUTPATIENT
Start: 2022-05-26

## 2022-05-26 RX ORDER — DICLOFENAC SODIUM 10 MG/G
GEL TOPICAL
Qty: 300 G | Refills: 1 | Status: SHIPPED | OUTPATIENT
Start: 2022-05-26

## 2022-05-26 RX ORDER — FLUTICASONE PROPIONATE 50 MCG
2 SPRAY, SUSPENSION (ML) NASAL DAILY
Qty: 1 EACH | Refills: 3 | Status: SHIPPED | OUTPATIENT
Start: 2022-05-26

## 2022-05-26 RX ORDER — LEVOTHYROXINE SODIUM 25 UG/1
25 TABLET ORAL
Qty: 90 TABLET | Refills: 1 | Status: SHIPPED | OUTPATIENT
Start: 2022-05-26

## 2022-05-26 RX ORDER — MINERAL OIL
180 ENEMA (ML) RECTAL
Qty: 30 TABLET | Refills: 1 | Status: SHIPPED | OUTPATIENT
Start: 2022-05-26

## 2022-05-26 RX ORDER — BETAMETHASONE VALERATE 1.2 MG/G
OINTMENT TOPICAL DAILY
Qty: 30 G | Refills: 0 | Status: SHIPPED | OUTPATIENT
Start: 2022-05-26

## 2022-05-26 NOTE — PROGRESS NOTES
Room 8     Identified pt with two pt identifiers(name and ). Reviewed record in preparation for visit and have obtained necessary documentation. All patient medications has been reviewed. Chief Complaint   Patient presents with    Diabetes     pt states when she gets hungry she starts to shake     Medication Evaluation     [place] of [place] and 3 month perscription     Diarrhea     pt  states pt needs medication for fever, vomiting, ansd diarrhea       3 most recent PHQ Screens 2022   Little interest or pleasure in doing things Not at all   Feeling down, depressed, irritable, or hopeless Not at all   Total Score PHQ 2 0     Abuse Screening Questionnaire 2021   Do you ever feel afraid of your partner? N   Are you in a relationship with someone who physically or mentally threatens you? N   Is it safe for you to go home? Y       Health Maintenance Due   Topic    Cervical cancer screen     COVID-19 Vaccine (3 - Booster for Moderna series)         Health Maintenance Review: Patient reminded of \"due or due soon\" health maintenance. I have asked the patient to contact his/her primary care provider (PCP) for follow-up on his/her health maintenance.     Vitals:    22 0826   BP: 130/82   Pulse: 87   Resp: 16   Temp: 98.1 °F (36.7 °C)   TempSrc: Oral   SpO2: 98%   Weight: 150 lb 3.2 oz (68.1 kg)   Height: 5' 3\" (1.6 m)   PainSc:   3   PainLoc: Shoulder   LMP: 2022       Wt Readings from Last 3 Encounters:   22 150 lb 3.2 oz (68.1 kg)   21 151 lb (68.5 kg)   21 143 lb 3.2 oz (65 kg)     Temp Readings from Last 3 Encounters:   22 98.1 °F (36.7 °C) (Oral)   21 98.3 °F (36.8 °C) (Oral)   21 97.2 °F (36.2 °C)     BP Readings from Last 3 Encounters:   22 130/82   21 124/76   21 125/78     Pulse Readings from Last 3 Encounters:   22 87   21 89   21 64       Coordination of Care Questionnaire:   1) Have you been to an emergency room, urgent care, or hospitalized since your last visit?   no       2. Have seen or consulted any other health care provider since your last visit? NO    Patient is accompanied by self and  I have received verbal consent from SANYA Magallanes to discuss any/all medical information while they are present in the room.
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
## 2022-05-27 LAB
25(OH)D3+25(OH)D2 SERPL-MCNC: 18.3 NG/ML (ref 30–100)
ALBUMIN SERPL-MCNC: 4.2 G/DL (ref 3.8–4.8)
ALBUMIN/GLOB SERPL: 1.4 {RATIO} (ref 1.2–2.2)
ALP SERPL-CCNC: 80 IU/L (ref 44–121)
ALT SERPL-CCNC: 17 IU/L (ref 0–32)
AST SERPL-CCNC: 19 IU/L (ref 0–40)
BASOPHILS # BLD AUTO: 0.1 X10E3/UL (ref 0–0.2)
BASOPHILS NFR BLD AUTO: 1 %
BILIRUB SERPL-MCNC: 0.2 MG/DL (ref 0–1.2)
BUN SERPL-MCNC: 12 MG/DL (ref 6–24)
BUN/CREAT SERPL: 23 (ref 9–23)
CALCIUM SERPL-MCNC: 8.9 MG/DL (ref 8.7–10.2)
CHLORIDE SERPL-SCNC: 103 MMOL/L (ref 96–106)
CHOLEST SERPL-MCNC: 223 MG/DL (ref 100–199)
CO2 SERPL-SCNC: 21 MMOL/L (ref 20–29)
CREAT SERPL-MCNC: 0.53 MG/DL (ref 0.57–1)
EGFR: 120 ML/MIN/1.73
EOSINOPHIL # BLD AUTO: 0.5 X10E3/UL (ref 0–0.4)
EOSINOPHIL NFR BLD AUTO: 6 %
ERYTHROCYTE [DISTWIDTH] IN BLOOD BY AUTOMATED COUNT: 13 % (ref 11.7–15.4)
EST. AVERAGE GLUCOSE BLD GHB EST-MCNC: 140 MG/DL
GLOBULIN SER CALC-MCNC: 2.9 G/DL (ref 1.5–4.5)
GLUCOSE SERPL-MCNC: 115 MG/DL (ref 65–99)
HBA1C MFR BLD: 6.5 % (ref 4.8–5.6)
HCT VFR BLD AUTO: 38.7 % (ref 34–46.6)
HDLC SERPL-MCNC: 46 MG/DL
HGB BLD-MCNC: 12.4 G/DL (ref 11.1–15.9)
IMM GRANULOCYTES # BLD AUTO: 0 X10E3/UL (ref 0–0.1)
IMM GRANULOCYTES NFR BLD AUTO: 0 %
IMP & REVIEW OF LAB RESULTS: NORMAL
LDLC SERPL CALC-MCNC: 156 MG/DL (ref 0–99)
LYMPHOCYTES # BLD AUTO: 2.6 X10E3/UL (ref 0.7–3.1)
LYMPHOCYTES NFR BLD AUTO: 31 %
MCH RBC QN AUTO: 27.7 PG (ref 26.6–33)
MCHC RBC AUTO-ENTMCNC: 32 G/DL (ref 31.5–35.7)
MCV RBC AUTO: 86 FL (ref 79–97)
MONOCYTES # BLD AUTO: 0.5 X10E3/UL (ref 0.1–0.9)
MONOCYTES NFR BLD AUTO: 6 %
NEUTROPHILS # BLD AUTO: 4.6 X10E3/UL (ref 1.4–7)
NEUTROPHILS NFR BLD AUTO: 56 %
PLATELET # BLD AUTO: 452 X10E3/UL (ref 150–450)
POTASSIUM SERPL-SCNC: 5 MMOL/L (ref 3.5–5.2)
PROT SERPL-MCNC: 7.1 G/DL (ref 6–8.5)
RBC # BLD AUTO: 4.48 X10E6/UL (ref 3.77–5.28)
SODIUM SERPL-SCNC: 139 MMOL/L (ref 134–144)
TRIGL SERPL-MCNC: 114 MG/DL (ref 0–149)
TSH SERPL DL<=0.005 MIU/L-ACNC: 2.24 UIU/ML (ref 0.45–4.5)
VLDLC SERPL CALC-MCNC: 21 MG/DL (ref 5–40)
WBC # BLD AUTO: 8.3 X10E3/UL (ref 3.4–10.8)

## 2022-06-02 DIAGNOSIS — E55.9 VITAMIN D DEFICIENCY: ICD-10-CM

## 2022-06-02 DIAGNOSIS — E11.9 TYPE 2 DIABETES MELLITUS WITHOUT COMPLICATION, WITHOUT LONG-TERM CURRENT USE OF INSULIN (HCC): Primary | ICD-10-CM

## 2022-06-02 RX ORDER — METFORMIN HYDROCHLORIDE 500 MG/1
500 TABLET, EXTENDED RELEASE ORAL
Qty: 90 TABLET | Refills: 1 | Status: SHIPPED | OUTPATIENT
Start: 2022-06-02

## 2022-06-02 RX ORDER — ERGOCALCIFEROL 1.25 MG/1
50000 CAPSULE ORAL
Qty: 4 CAPSULE | Refills: 3 | Status: SHIPPED | OUTPATIENT
Start: 2022-06-02

## 2022-06-02 NOTE — PROGRESS NOTES
vit D level very low.will start on vit D 50,000 unit 1 cap weekly for 4 months. will repeat level in 4 months. adv to be on milk product and expose to sun for 20 min a day. A1c has slightly improved but still she is diabetic. We can start her on metformin XR 1 tablet p.o. at bedtime. Repeat BMP in a month. Need to be an ADA diet and exercise. Total cholesterol LDL high, be on low-cholesterol diet and exercise. Will repeat lipid panel in 4 months.

## 2022-09-26 ENCOUNTER — OFFICE VISIT (OUTPATIENT)
Dept: INTERNAL MEDICINE CLINIC | Age: 42
End: 2022-09-26
Payer: COMMERCIAL

## 2022-09-26 VITALS
RESPIRATION RATE: 16 BRPM | SYSTOLIC BLOOD PRESSURE: 96 MMHG | HEART RATE: 74 BPM | TEMPERATURE: 98.2 F | BODY MASS INDEX: 26.58 KG/M2 | WEIGHT: 150 LBS | DIASTOLIC BLOOD PRESSURE: 62 MMHG | OXYGEN SATURATION: 98 % | HEIGHT: 63 IN

## 2022-09-26 DIAGNOSIS — E11.9 TYPE 2 DIABETES MELLITUS WITHOUT COMPLICATION, WITHOUT LONG-TERM CURRENT USE OF INSULIN (HCC): Primary | ICD-10-CM

## 2022-09-26 DIAGNOSIS — F32.1 CURRENT MODERATE EPISODE OF MAJOR DEPRESSIVE DISORDER, UNSPECIFIED WHETHER RECURRENT (HCC): ICD-10-CM

## 2022-09-26 DIAGNOSIS — J30.1 ALLERGIC RHINITIS DUE TO POLLEN, UNSPECIFIED SEASONALITY: ICD-10-CM

## 2022-09-26 DIAGNOSIS — Z23 ENCOUNTER FOR IMMUNIZATION: ICD-10-CM

## 2022-09-26 DIAGNOSIS — R30.0 DYSURIA: ICD-10-CM

## 2022-09-26 DIAGNOSIS — E55.9 VITAMIN D DEFICIENCY: ICD-10-CM

## 2022-09-26 DIAGNOSIS — M54.50 ACUTE MIDLINE LOW BACK PAIN WITHOUT SCIATICA: ICD-10-CM

## 2022-09-26 DIAGNOSIS — E78.00 PURE HYPERCHOLESTEROLEMIA: ICD-10-CM

## 2022-09-26 PROCEDURE — 90686 IIV4 VACC NO PRSV 0.5 ML IM: CPT | Performed by: INTERNAL MEDICINE

## 2022-09-26 PROCEDURE — 99214 OFFICE O/P EST MOD 30 MIN: CPT | Performed by: INTERNAL MEDICINE

## 2022-09-26 RX ORDER — BUPROPION HYDROCHLORIDE 100 MG/1
100 TABLET, EXTENDED RELEASE ORAL DAILY
Qty: 30 TABLET | Refills: 5 | Status: SHIPPED | OUTPATIENT
Start: 2022-09-26

## 2022-09-26 RX ORDER — LANCETS
EACH MISCELLANEOUS
Qty: 100 EACH | Refills: 2 | Status: SHIPPED | OUTPATIENT
Start: 2022-09-26

## 2022-09-26 RX ORDER — DICLOFENAC SODIUM 75 MG/1
75 TABLET, DELAYED RELEASE ORAL
Qty: 20 TABLET | Refills: 0 | Status: SHIPPED | OUTPATIENT
Start: 2022-09-26

## 2022-09-26 RX ORDER — INSULIN PUMP SYRINGE, 3 ML
EACH MISCELLANEOUS
Qty: 1 KIT | Refills: 0 | Status: SHIPPED | OUTPATIENT
Start: 2022-09-26
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
## 2022-09-26 NOTE — PROGRESS NOTES
Nursing staff confirmed patient with full name and . Prepared patient for visit today by obtaining vitals, verifying medication list and allergies, and briefly discussing reason for visit. Chief Complaint   Patient presents with    Follow Up Chronic Condition    Hypothyroidism       Current Outpatient Medications   Medication Sig    metFORMIN ER (GLUCOPHAGE XR) 500 mg tablet Take 1 Tablet by mouth daily (with dinner). ergocalciferol (ERGOCALCIFEROL) 1,250 mcg (50,000 unit) capsule Take 1 Capsule by mouth every seven (7) days. fexofenadine (ALLEGRA) 180 mg tablet Take 1 Tablet by mouth daily as needed for Allergies. fluticasone propionate (FLONASE) 50 mcg/actuation nasal spray 2 Sprays by Both Nostrils route daily. buPROPion XL (WELLBUTRIN XL) 150 mg tablet Take 1 Tablet by mouth every morning. levothyroxine (SYNTHROID) 25 mcg tablet Take 1 Tablet by mouth Daily (before breakfast). diclofenac (VOLTAREN) 1 % gel Apply  to affected area two (2) times daily as needed for Pain. betamethasone valerate (VALISONE) 0.1 % ointment Apply  to affected area daily. ciprofloxacin HCl (CIPRO) 500 mg tablet Take 1 Tablet by mouth two (2) times a day. silver sulfADIAZINE (SILVADENE) 1 % topical cream Apply  to affected area daily. cholecalciferol (VITAMIN D3) (1000 Units /25 mcg) tablet TAKE 1 TABLET BY MOUTH EVERY DAY    iron,carbonyl-vitamin C (Vitron-C) 65 mg iron- 125 mg TbEC TAKE 1 TABLET BY MOUTH EVERY DAY     No current facility-administered medications for this visit. 1. \"Have you been to the ER, urgent care clinic since your last visit? Hospitalized since your last visit? \" No    2. \"Have you seen or consulted any other health care providers outside of the [address] [place] [address] since your last visit? \" No     3. For patients aged 39-70: Has the patient had a colonoscopy / FIT/ Cologuard? NA - based on age      If the patient is female:    4.  For patients aged 41-77: Has the patient had a mammogram within the past 2 years? NA - based on age or sex      11. For patients aged 21-65: Has the patient had a pap smear? No    After obtaining consent, and per orders of Dr. Emanuel Beck, injection of Flu vaccine given by Antonette Leno. Patient instructed to remain in clinic for 20 minutes afterwards, and to report any adverse reaction to me immediately.

## 2022-09-30 LAB
25(OH)D3+25(OH)D2 SERPL-MCNC: 22.6 NG/ML (ref 30–100)
A ALTERNATA IGE QN: <0.1 KU/L
A FUMIGATUS IGE QN: <0.1 KU/L
ALBUMIN SERPL-MCNC: 4.5 G/DL (ref 3.8–4.8)
ALBUMIN/CREAT UR: 229 MG/G CREAT (ref 0–29)
ALBUMIN/GLOB SERPL: 1.6 {RATIO} (ref 1.2–2.2)
ALP SERPL-CCNC: 89 IU/L (ref 44–121)
ALT SERPL-CCNC: 13 IU/L (ref 0–32)
APPEARANCE UR: CLEAR
AST SERPL-CCNC: 19 IU/L (ref 0–40)
BACTERIA #/AREA URNS HPF: NORMAL /[HPF]
BACTERIA UR CULT: NORMAL
BERMUDA GRASS IGE QN: <0.1 KU/L
BILIRUB SERPL-MCNC: 0.3 MG/DL (ref 0–1.2)
BILIRUB UR QL STRIP: NEGATIVE
BOXELDER IGE QN: 0.2 KU/L
BUN SERPL-MCNC: 12 MG/DL (ref 6–24)
BUN/CREAT SERPL: 19 (ref 9–23)
C HERBARUM IGE QN: <0.1 KU/L
CALCIUM SERPL-MCNC: 8.9 MG/DL (ref 8.7–10.2)
CASTS URNS QL MICRO: NORMAL /LPF
CAT DANDER IGE QN: <0.1 KU/L
CHLORIDE SERPL-SCNC: 103 MMOL/L (ref 96–106)
CHOLEST SERPL-MCNC: 205 MG/DL (ref 100–199)
CLAM IGE QN: <0.1 KU/L
CMN PIGWEED IGE QN: 0.19 KU/L
CO2 SERPL-SCNC: 21 MMOL/L (ref 20–29)
CODFISH IGE QN: <0.1 KU/L
COLOR UR: YELLOW
COMMON RAGWEED IGE QN: 0.17 KU/L
CORN IGE QN: 0.21 KU/L
COTTONWOOD IGE QN: <0.1 KU/L
COW MILK IGE QN: <0.1 KU/L
CREAT SERPL-MCNC: 0.62 MG/DL (ref 0.57–1)
CREAT UR-MCNC: 60.2 MG/DL
D FARINAE IGE QN: <0.1 KU/L
D PTERONYSS IGE QN: <0.1 KU/L
DOG DANDER IGE QN: <0.1 KU/L
EGFR: 115 ML/MIN/1.73
EGG WHITE IGE QN: <0.1 KU/L
EPI CELLS #/AREA URNS HPF: NORMAL /HPF (ref 0–10)
EST. AVERAGE GLUCOSE BLD GHB EST-MCNC: 148 MG/DL
GLOBULIN SER CALC-MCNC: 2.9 G/DL (ref 1.5–4.5)
GLUCOSE SERPL-MCNC: 116 MG/DL (ref 70–99)
GLUCOSE UR QL STRIP: NEGATIVE
HBA1C MFR BLD: 6.8 % (ref 4.8–5.6)
HDLC SERPL-MCNC: 38 MG/DL
HGB UR QL STRIP: ABNORMAL
IGE SERPL-ACNC: 44 IU/ML (ref 6–495)
IMP & REVIEW OF LAB RESULTS: NORMAL
JOHNSON GRASS IGE QN: <0.1 KU/L
KETONES UR QL STRIP: NEGATIVE
LDLC SERPL CALC-MCNC: 139 MG/DL (ref 0–99)
LEUKOCYTE ESTERASE UR QL STRIP: ABNORMAL
Lab: ABNORMAL
MICRO URNS: ABNORMAL
MICROALBUMIN UR-MCNC: 137.9 UG/ML
MOUSE URINE PROT IGE QN: <0.1 KU/L
MT JUNIPER IGE QN: 0.14 KU/L
NITRITE UR QL STRIP: NEGATIVE
P NOTATUM IGE QN: <0.1 KU/L
PEANUT IGE QN: <0.1 KU/L
PECAN/HICK TREE IGE QN: 0.73 KU/L
PH UR STRIP: 6 [PH] (ref 5–7.5)
POTASSIUM SERPL-SCNC: 4.2 MMOL/L (ref 3.5–5.2)
PROT SERPL-MCNC: 7.4 G/DL (ref 6–8.5)
PROT UR QL STRIP: ABNORMAL
RBC #/AREA URNS HPF: NORMAL /HPF (ref 0–2)
ROACH IGE QN: <0.1 KU/L
SCALLOP IGE QN: <0.1 KU/L
SESAME SEED IGE QN: 0.22 KU/L
SHEEP SORREL IGE QN: <0.1 KU/L
SHRIMP IGE QN: <0.1 KU/L
SILVER BIRCH IGE QN: 0.14 KU/L
SODIUM SERPL-SCNC: 138 MMOL/L (ref 134–144)
SOYBEAN IGE QN: <0.1 KU/L
SP GR UR STRIP: 1.01 (ref 1–1.03)
TIMOTHY IGE QN: 0.12 KU/L
TRIGL SERPL-MCNC: 153 MG/DL (ref 0–149)
URINALYSIS REFLEX, 377202: ABNORMAL
UROBILINOGEN UR STRIP-MCNC: 0.2 MG/DL (ref 0.2–1)
VLDLC SERPL CALC-MCNC: 28 MG/DL (ref 5–40)
WALNUT IGE QN: <0.1 KU/L
WBC #/AREA URNS HPF: NORMAL /HPF (ref 0–5)
WHEAT IGE QN: 4.64 KU/L
WHITE ELM IGE QN: 1.19 KU/L
WHITE MULBERRY IGE QN: <0.1 KU/L
WHITE OAK IGE QN: 0.25 KU/L

## 2022-10-03 NOTE — PROGRESS NOTES
Cholesterol is elevated. Recommend that patient watch diet for fatty foods and exercise as tolerated. A1c more elevated, 6.8. Watch diet for foods high in sugar and carbohydrates, as well. Vitamin D level is low. Recommend OTC vitamin D3 2000 units po daily.         Low allergy to corn, sesame seeds, bryant grass, maple, silver birch, mountain cedar, white oak, ragweed, pigweed  Moderate allergy to American white elm, pecan tree  High allergy to wheat

## 2022-12-15 DIAGNOSIS — E03.9 HYPOTHYROIDISM, UNSPECIFIED TYPE: ICD-10-CM

## 2022-12-15 RX ORDER — LEVOTHYROXINE SODIUM 25 UG/1
TABLET ORAL
Qty: 90 TABLET | Refills: 1 | Status: SHIPPED | OUTPATIENT
Start: 2022-12-15

## 2023-02-15 ENCOUNTER — OFFICE VISIT (OUTPATIENT)
Dept: INTERNAL MEDICINE CLINIC | Age: 43
End: 2023-02-15
Payer: COMMERCIAL

## 2023-02-15 VITALS
TEMPERATURE: 97 F | SYSTOLIC BLOOD PRESSURE: 102 MMHG | HEIGHT: 63 IN | WEIGHT: 153.3 LBS | HEART RATE: 104 BPM | OXYGEN SATURATION: 98 % | BODY MASS INDEX: 27.16 KG/M2 | RESPIRATION RATE: 16 BRPM | DIASTOLIC BLOOD PRESSURE: 82 MMHG

## 2023-02-15 DIAGNOSIS — E11.9 TYPE 2 DIABETES MELLITUS WITHOUT COMPLICATION, WITHOUT LONG-TERM CURRENT USE OF INSULIN (HCC): ICD-10-CM

## 2023-02-15 DIAGNOSIS — N92.0 MENORRHAGIA WITH REGULAR CYCLE: Primary | ICD-10-CM

## 2023-02-15 DIAGNOSIS — M89.8X1 COLLAR BONE PAIN: ICD-10-CM

## 2023-02-15 DIAGNOSIS — J30.1 ALLERGIC RHINITIS DUE TO POLLEN, UNSPECIFIED SEASONALITY: ICD-10-CM

## 2023-02-15 DIAGNOSIS — F32.1 CURRENT MODERATE EPISODE OF MAJOR DEPRESSIVE DISORDER, UNSPECIFIED WHETHER RECURRENT (HCC): ICD-10-CM

## 2023-02-15 DIAGNOSIS — E03.9 HYPOTHYROIDISM, UNSPECIFIED TYPE: ICD-10-CM

## 2023-02-15 DIAGNOSIS — E55.9 VITAMIN D DEFICIENCY: ICD-10-CM

## 2023-02-15 PROCEDURE — 99214 OFFICE O/P EST MOD 30 MIN: CPT | Performed by: INTERNAL MEDICINE

## 2023-02-15 RX ORDER — BUPROPION HYDROCHLORIDE 100 MG/1
100 TABLET, EXTENDED RELEASE ORAL DAILY
Qty: 90 TABLET | Refills: 1 | Status: SHIPPED | OUTPATIENT
Start: 2023-02-15

## 2023-02-15 RX ORDER — DICLOFENAC SODIUM 75 MG/1
75 TABLET, DELAYED RELEASE ORAL
Qty: 20 TABLET | Refills: 0 | Status: SHIPPED | OUTPATIENT
Start: 2023-02-15

## 2023-02-15 RX ORDER — FLUTICASONE PROPIONATE 50 MCG
2 SPRAY, SUSPENSION (ML) NASAL DAILY
Qty: 1 EACH | Refills: 3 | Status: SHIPPED | OUTPATIENT
Start: 2023-02-15

## 2023-02-15 RX ORDER — MINERAL OIL
180 ENEMA (ML) RECTAL
Qty: 30 TABLET | Refills: 1 | Status: SHIPPED | OUTPATIENT
Start: 2023-02-15

## 2023-02-15 RX ORDER — METFORMIN HYDROCHLORIDE 500 MG/1
500 TABLET, EXTENDED RELEASE ORAL
Qty: 90 TABLET | Refills: 1 | Status: SHIPPED | OUTPATIENT
Start: 2023-02-15

## 2023-02-15 NOTE — PROGRESS NOTES
HISTORY OF PRESENT ILLNESS  Danny Ravi is a 40 y.o. female here to follow-up. Report heavy menses for last several months. Did not see gynecologist for Pap smear for long time. She is having allergy problem. Having nasal congestion and runny nose. Need refill on medication. Depression seems much better controlled. On Wellbutrin XL. She is working full-time which is helping. Relationship with  has improved. Has hypothyroid, on Synthroid. Need lab work. Report pain on her left collarbone, no fall or injury. She use her left arm a lot. She is diabetic, on metformin, doing well. Lab work. Diagnosed with low thyroid, need lab work. Taking Synthroid. Diabetes    Medication Evaluation    Hypertension   Associated symptoms include neck pain. Thyroid Problem    Depression    Irregular Menses      Review of Systems   Constitutional: Negative. HENT:  Positive for congestion. Eyes: Negative. Gastrointestinal:  Positive for diarrhea. Genitourinary: Negative. Musculoskeletal:  Positive for neck pain. Skin: Negative. Neurological: Negative. Endo/Heme/Allergies: Negative. Psychiatric/Behavioral:  Positive for depression. Physical Exam  Constitutional:       General: She is not in acute distress. Appearance: She is well-developed. HENT:      Head: Normocephalic. Right Ear: Tympanic membrane normal.      Left Ear: Tympanic membrane normal.      Nose: Congestion present. Comments: Nasal turbinates:red inflamed,NT    Cobble stoning present. Mouth/Throat:      Mouth: Mucous membranes are moist.   Eyes:      Extraocular Movements: Extraocular movements intact. Pupils: Pupils are equal, round, and reactive to light. Neck:      Vascular: No JVD. Cardiovascular:      Rate and Rhythm: Normal rate and regular rhythm. Pulses: Normal pulses. Heart sounds: Normal heart sounds.    Pulmonary:      Effort: Pulmonary effort is normal. No respiratory distress. Breath sounds: Normal breath sounds. No wheezing. Abdominal:      General: Bowel sounds are normal. There is no distension. Palpations: Abdomen is soft. Tenderness: There is no abdominal tenderness. Musculoskeletal:      Cervical back: Normal range of motion and neck supple. Comments: Left collarbone: Mild tenderness present. Lymphadenopathy:      Cervical: No cervical adenopathy. Skin:     Comments:      Neurological:      General: No focal deficit present. Mental Status: She is alert and oriented to person, place, and time. Mental status is at baseline. Psychiatric:         Mood and Affect: Mood normal.         Behavior: Behavior normal.      Comments: Stable depression. ASSESSMENT and PLAN    Diagnoses and all orders for this visit:    1. Menorrhagia with regular cycle    Will refer,  -     REFERRAL TO OBSTETRICS AND GYNECOLOGY  -     CBC WITH AUTOMATED DIFF    2. Allergic rhinitis due to pollen, unspecified seasonality    We will refill,  -     fexofenadine (ALLEGRA) 180 mg tablet; Take 1 Tablet by mouth daily as needed for Allergies. -     fluticasone propionate (FLONASE) 50 mcg/actuation nasal spray; 2 Sprays by Both Nostrils route daily. 3. Current moderate episode of major depressive disorder, unspecified whether recurrent ([address]    Much better. Will refill,  -     buPROPion SR (WELLBUTRIN SR) 100 mg SR tablet; Take 1 Tablet by mouth daily. DC wellbutrin XL    4. Type 2 diabetes mellitus without complication, without long-term current use of insulin (HCC)    Be on 1800-calorie ADA diet and exercise. Will refill,  -     metFORMIN ER (GLUCOPHAGE XR) 500 mg tablet; Take 1 Tablet by mouth daily (with dinner). -     METABOLIC PANEL, COMPREHENSIVE  -     HEMOGLOBIN A1C WITH EAG  -     REFERRAL TO OPHTHALMOLOGY    5. Hypothyroidism, unspecified type    On Synthroid 25 mcg a day. We will check,  -     TSH 3RD GENERATION    6.  Vitamin D deficiency  - VITAMIN D, 25 HYDROXY    7. Collar bone pain    Try to rest and ice pack. We will give,  -     diclofenac EC (VOLTAREN) 75 mg EC tablet; Take 1 Tablet by mouth two (2) times daily as needed for Pain. Discussed expected course/resolution/complications of diagnosis in detail with patient. Medication risks/benefits/costs/interactions/alternatives discussed with patient. Discussed COVID-19 infection precaution with patient. Pt was given an after visit summary which includes diagnoses, current medications & vitals. Pt expressed understanding with the diagnosis and plan.

## 2023-02-15 NOTE — PROGRESS NOTES
Nursing staff confirmed patient with full name and . Prepared patient for visit today by obtaining vitals, verifying medication list and allergies, and briefly discussing reason for visit. Chief Complaint   Patient presents with    Irregular Menses     Twice a month now        Current Outpatient Medications   Medication Sig    levothyroxine (SYNTHROID) 25 mcg tablet TAKE 1 TABLET BY MOUTH EVERY DAY BEFORE BREAKFAST    diclofenac EC (VOLTAREN) 75 mg EC tablet Take 1 Tablet by mouth two (2) times daily as needed for Pain. Blood-Glucose Meter monitoring kit Check BS every day. DX:DM type 2    glucose blood VI test strips strip Check BS every day. DX:DM type 2    lancets misc Check BS QD    buPROPion SR (WELLBUTRIN SR) 100 mg SR tablet Take 1 Tablet by mouth daily. DC wellbutrin XL    metFORMIN ER (GLUCOPHAGE XR) 500 mg tablet Take 1 Tablet by mouth daily (with dinner). ergocalciferol (ERGOCALCIFEROL) 1,250 mcg (50,000 unit) capsule Take 1 Capsule by mouth every seven (7) days. fexofenadine (ALLEGRA) 180 mg tablet Take 1 Tablet by mouth daily as needed for Allergies. fluticasone propionate (FLONASE) 50 mcg/actuation nasal spray 2 Sprays by Both Nostrils route daily. diclofenac (VOLTAREN) 1 % gel Apply  to affected area two (2) times daily as needed for Pain. betamethasone valerate (VALISONE) 0.1 % ointment Apply  to affected area daily. ciprofloxacin HCl (CIPRO) 500 mg tablet Take 1 Tablet by mouth two (2) times a day. silver sulfADIAZINE (SILVADENE) 1 % topical cream Apply  to affected area daily. cholecalciferol (VITAMIN D3) (1000 Units /25 mcg) tablet TAKE 1 TABLET BY MOUTH EVERY DAY    iron,carbonyl-vitamin C (Vitron-C) 65 mg iron- 125 mg TbEC TAKE 1 TABLET BY MOUTH EVERY DAY     No current facility-administered medications for this visit. 1. \"Have you been to the ER, urgent care clinic since your last visit? Hospitalized since your last visit? \" No    2.  \"Have you seen or consulted any other health care providers outside of the [address] [place] [address] since your last visit? \" No     3. For patients aged 39-70: Has the patient had a colonoscopy / FIT/ Cologuard? NA - based on age      If the patient is female:    4. For patients aged 41-77: Has the patient had a mammogram within the past 2 years? NA - based on age or sex      11. For patients aged 21-65: Has the patient had a pap smear?  No

## 2023-02-16 LAB
25(OH)D3+25(OH)D2 SERPL-MCNC: 18.9 NG/ML (ref 30–100)
ALBUMIN SERPL-MCNC: 4.3 G/DL (ref 3.8–4.8)
ALBUMIN/GLOB SERPL: 1.4 {RATIO} (ref 1.2–2.2)
ALP SERPL-CCNC: 87 IU/L (ref 44–121)
ALT SERPL-CCNC: 13 IU/L (ref 0–32)
AST SERPL-CCNC: 18 IU/L (ref 0–40)
BASOPHILS # BLD AUTO: 0.1 X10E3/UL (ref 0–0.2)
BASOPHILS NFR BLD AUTO: 1 %
BILIRUB SERPL-MCNC: 0.2 MG/DL (ref 0–1.2)
BUN SERPL-MCNC: 11 MG/DL (ref 6–24)
BUN/CREAT SERPL: 20 (ref 9–23)
CALCIUM SERPL-MCNC: 9.1 MG/DL (ref 8.7–10.2)
CHLORIDE SERPL-SCNC: 104 MMOL/L (ref 96–106)
CO2 SERPL-SCNC: 23 MMOL/L (ref 20–29)
CREAT SERPL-MCNC: 0.55 MG/DL (ref 0.57–1)
EGFRCR SERPLBLD CKD-EPI 2021: 118 ML/MIN/1.73
EOSINOPHIL # BLD AUTO: 0.4 X10E3/UL (ref 0–0.4)
EOSINOPHIL NFR BLD AUTO: 5 %
ERYTHROCYTE [DISTWIDTH] IN BLOOD BY AUTOMATED COUNT: 13.4 % (ref 11.7–15.4)
EST. AVERAGE GLUCOSE BLD GHB EST-MCNC: 143 MG/DL
GLOBULIN SER CALC-MCNC: 3 G/DL (ref 1.5–4.5)
GLUCOSE SERPL-MCNC: 132 MG/DL (ref 70–99)
HBA1C MFR BLD: 6.6 % (ref 4.8–5.6)
HCT VFR BLD AUTO: 38.3 % (ref 34–46.6)
HGB BLD-MCNC: 12.4 G/DL (ref 11.1–15.9)
IMM GRANULOCYTES # BLD AUTO: 0 X10E3/UL (ref 0–0.1)
IMM GRANULOCYTES NFR BLD AUTO: 0 %
LYMPHOCYTES # BLD AUTO: 2 X10E3/UL (ref 0.7–3.1)
LYMPHOCYTES NFR BLD AUTO: 25 %
MCH RBC QN AUTO: 27.1 PG (ref 26.6–33)
MCHC RBC AUTO-ENTMCNC: 32.4 G/DL (ref 31.5–35.7)
MCV RBC AUTO: 84 FL (ref 79–97)
MONOCYTES # BLD AUTO: 0.4 X10E3/UL (ref 0.1–0.9)
MONOCYTES NFR BLD AUTO: 5 %
NEUTROPHILS # BLD AUTO: 5.3 X10E3/UL (ref 1.4–7)
NEUTROPHILS NFR BLD AUTO: 64 %
PLATELET # BLD AUTO: 465 X10E3/UL (ref 150–450)
POTASSIUM SERPL-SCNC: 4.1 MMOL/L (ref 3.5–5.2)
PROT SERPL-MCNC: 7.3 G/DL (ref 6–8.5)
RBC # BLD AUTO: 4.58 X10E6/UL (ref 3.77–5.28)
SODIUM SERPL-SCNC: 143 MMOL/L (ref 134–144)
TSH SERPL DL<=0.005 MIU/L-ACNC: 1.53 UIU/ML (ref 0.45–4.5)
WBC # BLD AUTO: 8.3 X10E3/UL (ref 3.4–10.8)

## 2023-05-25 ENCOUNTER — OFFICE VISIT (OUTPATIENT)
Age: 43
End: 2023-05-25
Payer: COMMERCIAL

## 2023-05-25 VITALS
WEIGHT: 146 LBS | SYSTOLIC BLOOD PRESSURE: 134 MMHG | BODY MASS INDEX: 25.87 KG/M2 | HEIGHT: 63 IN | DIASTOLIC BLOOD PRESSURE: 84 MMHG

## 2023-05-25 DIAGNOSIS — N93.9 ABNORMAL UTERINE BLEEDING (AUB): Primary | ICD-10-CM

## 2023-05-25 PROCEDURE — 99213 OFFICE O/P EST LOW 20 MIN: CPT | Performed by: OBSTETRICS & GYNECOLOGY

## 2023-05-25 NOTE — PROGRESS NOTES
status: Never    Smokeless tobacco: Never   Substance and Sexual Activity    Alcohol use: Not Currently    Drug use: Never   Social History Narrative         ** Merged History Encounter **     Social Determinants of Health     Financial Resource Strain: Low Risk     Difficulty of Paying Living Expenses: Not hard at all   Food Insecurity: No Food Insecurity    Worried About Running Out of Food in the Last Year: Never true    Ran Out of Food in the Last Year: Never true     No Known Allergies      Current Outpatient Medications:     Lancets MISC, Check BS QD, Disp: , Rfl:     betamethasone valerate (VALISONE) 0.1 % ointment, Apply topically daily, Disp: , Rfl:     buPROPion (WELLBUTRIN SR) 100 MG extended release tablet, Take 1 tablet by mouth daily, Disp: , Rfl:     vitamin D (CHOLECALCIFEROL) 25 MCG (1000 UT) TABS tablet, Take 1 tablet by mouth daily, Disp: , Rfl:     diclofenac sodium (VOLTAREN) 1 % GEL, Apply topically 2 times daily as needed, Disp: , Rfl:     diclofenac (VOLTAREN) 75 MG EC tablet, Take 1 tablet by mouth 2 times daily as needed, Disp: , Rfl:     fexofenadine (ALLEGRA) 180 MG tablet, Take 1 tablet by mouth daily as needed, Disp: , Rfl:     fluticasone (FLONASE) 50 MCG/ACT nasal spray, 2 sprays by Nasal route daily, Disp: , Rfl:     Iron-Vitamin C (VITRON-C)  MG TABS, Take 1 tablet by mouth daily, Disp: , Rfl:     levothyroxine (SYNTHROID) 25 MCG tablet, Take 1 tablet by mouth every morning (before breakfast), Disp: , Rfl:     metFORMIN (GLUCOPHAGE-XR) 500 MG extended release tablet, Take 1 tablet by mouth Daily with supper, Disp: , Rfl:       Review of Systems - History obtained from the patient  Constitutional: negative for weight loss, fever, night sweats  HEENT: negative for hearing loss, earache, congestion, snoring, sorethroat  CV: negative for chest pain, palpitations, edema  Resp: negative for cough, shortness of breath, wheezing  GI: negative for change in bowel habits, abdominal

## 2023-07-05 DIAGNOSIS — J30.1 ALLERGIC RHINITIS DUE TO POLLEN: ICD-10-CM

## 2023-07-05 DIAGNOSIS — E03.9 HYPOTHYROIDISM, UNSPECIFIED: ICD-10-CM

## 2023-07-05 RX ORDER — LEVOTHYROXINE SODIUM 0.03 MG/1
TABLET ORAL
Qty: 90 TABLET | Refills: 1 | Status: SHIPPED | OUTPATIENT
Start: 2023-07-05

## 2023-07-05 RX ORDER — FEXOFENADINE HCL 180 MG/1
TABLET ORAL
Qty: 30 TABLET | Refills: 1 | Status: SHIPPED | OUTPATIENT
Start: 2023-07-05

## 2023-07-05 NOTE — TELEPHONE ENCOUNTER
Requested Prescriptions     Pending Prescriptions Disp Refills    levothyroxine (SYNTHROID) 25 MCG tablet [Pharmacy Med Name: LEVOTHYROXINE 25 MCG TABLET] 90 tablet 1     Sig: TAKE 1 TABLET BY MOUTH EVERY DAY BEFORE BREAKFAST    fexofenadine (ALLEGRA) 180 MG tablet [Pharmacy Med Name: FEXOFENADINE  MG TABLET] 30 tablet 1     Sig: TAKE 1 TABLET BY MOUTH DAILY AS NEEDED FOR ALLERGIES.         [facility]/pharmacy #[id number] [place] [address]. [facility] [phone] - F [phone]  [address] [facility] [address]  Phone: [phone] Fax: [phone]       Last appt 2/15/2023      Future Appointments   Date Time Provider [address] [place] [address]   8/10/2023  9:45 AM Castro Rodriguez MD SAKSHI BS AMB

## 2023-09-20 ENCOUNTER — OFFICE VISIT (OUTPATIENT)
Age: 43
End: 2023-09-20
Payer: COMMERCIAL

## 2023-09-20 VITALS
DIASTOLIC BLOOD PRESSURE: 78 MMHG | TEMPERATURE: 98.3 F | WEIGHT: 149.2 LBS | HEIGHT: 63 IN | RESPIRATION RATE: 16 BRPM | BODY MASS INDEX: 26.44 KG/M2 | HEART RATE: 100 BPM | SYSTOLIC BLOOD PRESSURE: 112 MMHG | OXYGEN SATURATION: 97 %

## 2023-09-20 DIAGNOSIS — F32.1 MAJOR DEPRESSIVE DISORDER, SINGLE EPISODE, MODERATE (HCC): ICD-10-CM

## 2023-09-20 DIAGNOSIS — E55.9 VITAMIN D DEFICIENCY: ICD-10-CM

## 2023-09-20 DIAGNOSIS — R30.0 DYSURIA: Primary | ICD-10-CM

## 2023-09-20 DIAGNOSIS — E03.8 HYPOTHYROIDISM DUE TO HASHIMOTO'S THYROIDITIS: ICD-10-CM

## 2023-09-20 DIAGNOSIS — J32.0 CHRONIC MAXILLARY SINUSITIS: ICD-10-CM

## 2023-09-20 DIAGNOSIS — E06.3 HYPOTHYROIDISM DUE TO HASHIMOTO'S THYROIDITIS: ICD-10-CM

## 2023-09-20 DIAGNOSIS — Z12.31 ENCOUNTER FOR SCREENING MAMMOGRAM FOR MALIGNANT NEOPLASM OF BREAST: ICD-10-CM

## 2023-09-20 DIAGNOSIS — E11.9 TYPE 2 DIABETES MELLITUS WITHOUT COMPLICATION, WITHOUT LONG-TERM CURRENT USE OF INSULIN (HCC): ICD-10-CM

## 2023-09-20 DIAGNOSIS — E78.00 PURE HYPERCHOLESTEROLEMIA, UNSPECIFIED: ICD-10-CM

## 2023-09-20 LAB
BILIRUBIN, URINE, POC: NEGATIVE
BLOOD URINE, POC: ABNORMAL
GLUCOSE URINE, POC: NEGATIVE
KETONES, URINE, POC: NEGATIVE
LEUKOCYTE ESTERASE, URINE, POC: NEGATIVE
NITRITE, URINE, POC: NEGATIVE
PH, URINE, POC: 5 (ref 4.6–8)
PROTEIN,URINE, POC: ABNORMAL
SPECIFIC GRAVITY, URINE, POC: 1.02 (ref 1–1.03)
URINALYSIS CLARITY, POC: ABNORMAL
URINALYSIS COLOR, POC: YELLOW
UROBILINOGEN, POC: NORMAL

## 2023-09-20 PROCEDURE — 99214 OFFICE O/P EST MOD 30 MIN: CPT | Performed by: INTERNAL MEDICINE

## 2023-09-20 PROCEDURE — 81003 URINALYSIS AUTO W/O SCOPE: CPT | Performed by: INTERNAL MEDICINE

## 2023-09-20 PROCEDURE — PBSHW AMB POC URINALYSIS DIP STICK AUTO W/O MICRO: Performed by: INTERNAL MEDICINE

## 2023-09-20 PROCEDURE — 3044F HG A1C LEVEL LT 7.0%: CPT | Performed by: INTERNAL MEDICINE

## 2023-09-20 RX ORDER — NORETHINDRONE ACETATE AND ETHINYL ESTRADIOL, AND FERROUS FUMARATE 1MG-20(24)
1 KIT ORAL DAILY
COMMUNITY
Start: 2023-07-05

## 2023-09-20 RX ORDER — METFORMIN HYDROCHLORIDE 500 MG/1
500 TABLET, EXTENDED RELEASE ORAL
Qty: 90 TABLET | Refills: 1 | Status: SHIPPED | OUTPATIENT
Start: 2023-09-20

## 2023-09-20 RX ORDER — LEVOTHYROXINE SODIUM 0.03 MG/1
25 TABLET ORAL
Qty: 90 TABLET | Refills: 1 | Status: SHIPPED | OUTPATIENT
Start: 2023-09-20

## 2023-09-20 RX ORDER — BUPROPION HYDROCHLORIDE 100 MG/1
100 TABLET, EXTENDED RELEASE ORAL DAILY
Qty: 90 TABLET | Refills: 1 | Status: SHIPPED | OUTPATIENT
Start: 2023-09-20

## 2023-09-20 SDOH — ECONOMIC STABILITY: HOUSING INSECURITY
IN THE LAST 12 MONTHS, WAS THERE A TIME WHEN YOU DID NOT HAVE A STEADY PLACE TO SLEEP OR SLEPT IN A SHELTER (INCLUDING NOW)?: NO

## 2023-09-20 SDOH — ECONOMIC STABILITY: FOOD INSECURITY: WITHIN THE PAST 12 MONTHS, YOU WORRIED THAT YOUR FOOD WOULD RUN OUT BEFORE YOU GOT MONEY TO BUY MORE.: NEVER TRUE

## 2023-09-20 SDOH — ECONOMIC STABILITY: INCOME INSECURITY: HOW HARD IS IT FOR YOU TO PAY FOR THE VERY BASICS LIKE FOOD, HOUSING, MEDICAL CARE, AND HEATING?: NOT HARD AT ALL

## 2023-09-20 SDOH — ECONOMIC STABILITY: FOOD INSECURITY: WITHIN THE PAST 12 MONTHS, THE FOOD YOU BOUGHT JUST DIDN'T LAST AND YOU DIDN'T HAVE MONEY TO GET MORE.: NEVER TRUE

## 2023-09-20 ASSESSMENT — ENCOUNTER SYMPTOMS
GASTROINTESTINAL NEGATIVE: 1
SINUS PRESSURE: 1
EYES NEGATIVE: 1
RESPIRATORY NEGATIVE: 1
SINUS PAIN: 1

## 2023-09-20 ASSESSMENT — PATIENT HEALTH QUESTIONNAIRE - PHQ9
SUM OF ALL RESPONSES TO PHQ QUESTIONS 1-9: 0
SUM OF ALL RESPONSES TO PHQ QUESTIONS 1-9: 0
SUM OF ALL RESPONSES TO PHQ9 QUESTIONS 1 & 2: 0
2. FEELING DOWN, DEPRESSED OR HOPELESS: 0
SUM OF ALL RESPONSES TO PHQ QUESTIONS 1-9: 0
SUM OF ALL RESPONSES TO PHQ QUESTIONS 1-9: 0
1. LITTLE INTEREST OR PLEASURE IN DOING THINGS: 0

## 2023-09-20 NOTE — PROGRESS NOTES
Subjective:      Patient ID: Sree Summers is a 40 y.o. female here for follow-up. She is diabetic, taking metformin. Not monitoring blood sugar. Need refill on medication. Suffer from anxiety and depression. She has marital problem, would like to leave her  but not sure how she can do it. No physical abuse. Taking Wellbutrin which is helping her with her anxiety and depression. Need refill. Has hypothyroid, on Synthroid. Need lab work. She suffer from chronic sinus infection, would like to see an ENT. Has a lot of sinus pain and pressure on the left side of forehead and face. Need mammogram.  Diabetes  Hypoglycemia symptoms include nervousness/anxiousness. Review of Systems   Constitutional: Negative. HENT:  Positive for sinus pressure and sinus pain. Eyes: Negative. Respiratory: Negative. Cardiovascular: Negative. Gastrointestinal: Negative. Endocrine: Negative. Genitourinary: Negative. Musculoskeletal: Negative. Skin: Negative. Neurological: Negative. Hematological: Negative. Psychiatric/Behavioral:  The patient is nervous/anxious. Objective:   Physical Exam  Constitutional:       Appearance: Normal appearance. HENT:      Head: Normocephalic and atraumatic. Right Ear: Tympanic membrane normal.      Left Ear: Tympanic membrane normal.      Nose: Congestion present. Comments: Nasal turbinates:red inflamed,NT    Cobble stoning present. Mouth/Throat:      Mouth: Mucous membranes are moist.   Eyes:      Extraocular Movements: Extraocular movements intact. Conjunctiva/sclera: Conjunctivae normal.      Pupils: Pupils are equal, round, and reactive to light. Cardiovascular:      Rate and Rhythm: Normal rate and regular rhythm. Pulses: Normal pulses. Heart sounds: Normal heart sounds. Pulmonary:      Effort: Pulmonary effort is normal.      Breath sounds: Normal breath sounds.    Abdominal:      General: Abdomen is

## 2023-09-22 LAB — BACTERIA UR CULT: NORMAL

## 2023-10-20 ENCOUNTER — OFFICE VISIT (OUTPATIENT)
Age: 43
End: 2023-10-20
Payer: COMMERCIAL

## 2023-10-20 VITALS — WEIGHT: 150 LBS | DIASTOLIC BLOOD PRESSURE: 70 MMHG | SYSTOLIC BLOOD PRESSURE: 110 MMHG | BODY MASS INDEX: 26.57 KG/M2

## 2023-10-20 DIAGNOSIS — N93.9 ABNORMAL UTERINE BLEEDING: Primary | ICD-10-CM

## 2023-10-20 PROCEDURE — 99212 OFFICE O/P EST SF 10 MIN: CPT

## 2023-10-20 NOTE — PROGRESS NOTES
Problem Visit    Bassam Vidal is a 40 y.o.  presenting for problem visit. Her main concern today is AUB. She reports heavier cycles when starting her birth control pill. She is also having lower abd and back pain even when she is not on her cycle. She had 2 cycles in the month of September. Pt started birth control in May. Ob/Gyn Hx:    - 2 c-sections  LMP- 2 cycles last month  Menses- irregular  Contraception- pill  SA- yes    Past Medical History:   Diagnosis Date    Depression        Past Surgical History:   Procedure Laterality Date     SECTION      x2    DILATION AND CURETTAGE OF UTERUS      PELVIC LAPAROSCOPY      SALPINGO-OOPHORECTOMY      left ,for ectopic pregnancy       Family History   Problem Relation Age of Onset    Heart Disease Sister     Diabetes Mother     Heart Disease Mother     No Known Problems Father     Heart Disease Brother        Social History     Socioeconomic History    Marital status:      Spouse name: Not on file    Number of children: Not on file    Years of education: Not on file    Highest education level: Not on file   Occupational History    Not on file   Tobacco Use    Smoking status: Never    Smokeless tobacco: Never   Substance and Sexual Activity    Alcohol use: Not Currently    Drug use: Never    Sexual activity: Not on file     Comment: working full time. 2 children.    Other Topics Concern    Not on file   Social History Narrative         ** Merged History Encounter **     Social Determinants of Health     Financial Resource Strain: Low Risk  (2023)    Overall Financial Resource Strain (CARDIA)     Difficulty of Paying Living Expenses: Not hard at all   Food Insecurity: No Food Insecurity (2023)    Hunger Vital Sign     Worried About Running Out of Food in the Last Year: Never true     Ran Out of Food in the Last Year: Never true   Transportation Needs: Unknown (2023)    PRAPARE - Transportation     Lack of Transportation (Medical):

## 2023-12-03 ENCOUNTER — HOSPITAL ENCOUNTER (EMERGENCY)
Facility: HOSPITAL | Age: 43
Discharge: HOME | End: 2023-12-03
Attending: STUDENT IN AN ORGANIZED HEALTH CARE EDUCATION/TRAINING PROGRAM
Payer: COMMERCIAL

## 2023-12-03 ENCOUNTER — APPOINTMENT (OUTPATIENT)
Facility: HOSPITAL | Age: 43
End: 2023-12-03
Payer: COMMERCIAL

## 2023-12-03 VITALS
TEMPERATURE: 97.6 F | DIASTOLIC BLOOD PRESSURE: 68 MMHG | BODY MASS INDEX: 28.68 KG/M2 | OXYGEN SATURATION: 97 % | HEIGHT: 61 IN | SYSTOLIC BLOOD PRESSURE: 127 MMHG | HEART RATE: 94 BPM | WEIGHT: 151.9 LBS | RESPIRATION RATE: 16 BRPM

## 2023-12-03 DIAGNOSIS — R07.9 CHEST PAIN, UNSPECIFIED TYPE: ICD-10-CM

## 2023-12-03 DIAGNOSIS — R51.9 ACUTE NONINTRACTABLE HEADACHE, UNSPECIFIED HEADACHE TYPE: Primary | ICD-10-CM

## 2023-12-03 LAB
ALBUMIN SERPL-MCNC: 3.3 G/DL (ref 3.5–5)
ALBUMIN/GLOB SERPL: 0.8 (ref 1.1–2.2)
ALP SERPL-CCNC: 72 U/L (ref 45–117)
ALT SERPL-CCNC: 27 U/L (ref 12–78)
ANION GAP SERPL CALC-SCNC: 9 MMOL/L (ref 5–15)
AST SERPL-CCNC: 28 U/L (ref 15–37)
BASOPHILS # BLD: 0.1 K/UL (ref 0–0.1)
BASOPHILS NFR BLD: 1 % (ref 0–1)
BILIRUB SERPL-MCNC: 0.4 MG/DL (ref 0.2–1)
BUN SERPL-MCNC: 9 MG/DL (ref 6–20)
BUN/CREAT SERPL: 15 (ref 12–20)
CALCIUM SERPL-MCNC: 8.7 MG/DL (ref 8.5–10.1)
CHLORIDE SERPL-SCNC: 107 MMOL/L (ref 97–108)
CO2 SERPL-SCNC: 20 MMOL/L (ref 21–32)
COMMENT:: NORMAL
CREAT SERPL-MCNC: 0.62 MG/DL (ref 0.55–1.02)
D DIMER PPP FEU-MCNC: 0.46 MG/L FEU (ref 0–0.65)
DIFFERENTIAL METHOD BLD: ABNORMAL
EKG ATRIAL RATE: 94 BPM
EKG DIAGNOSIS: NORMAL
EKG P AXIS: 57 DEGREES
EKG P-R INTERVAL: 162 MS
EKG Q-T INTERVAL: 378 MS
EKG QRS DURATION: 82 MS
EKG QTC CALCULATION (BAZETT): 472 MS
EKG R AXIS: 58 DEGREES
EKG T AXIS: 63 DEGREES
EKG VENTRICULAR RATE: 94 BPM
EOSINOPHIL # BLD: 0.4 K/UL (ref 0–0.4)
EOSINOPHIL NFR BLD: 4 % (ref 0–7)
ERYTHROCYTE [DISTWIDTH] IN BLOOD BY AUTOMATED COUNT: 13.9 % (ref 11.5–14.5)
GLOBULIN SER CALC-MCNC: 4.4 G/DL (ref 2–4)
GLUCOSE SERPL-MCNC: 148 MG/DL (ref 65–100)
HCG UR QL: NEGATIVE
HCT VFR BLD AUTO: 41.6 % (ref 35–47)
HGB BLD-MCNC: 13.7 G/DL (ref 11.5–16)
IMM GRANULOCYTES # BLD AUTO: 0 K/UL (ref 0–0.04)
IMM GRANULOCYTES NFR BLD AUTO: 0 % (ref 0–0.5)
LYMPHOCYTES # BLD: 2.9 K/UL (ref 0.8–3.5)
LYMPHOCYTES NFR BLD: 27 % (ref 12–49)
MAGNESIUM SERPL-MCNC: 1.9 MG/DL (ref 1.6–2.4)
MCH RBC QN AUTO: 28.9 PG (ref 26–34)
MCHC RBC AUTO-ENTMCNC: 32.9 G/DL (ref 30–36.5)
MCV RBC AUTO: 87.8 FL (ref 80–99)
MONOCYTES # BLD: 0.5 K/UL (ref 0–1)
MONOCYTES NFR BLD: 5 % (ref 5–13)
NEUTS SEG # BLD: 6.6 K/UL (ref 1.8–8)
NEUTS SEG NFR BLD: 63 % (ref 32–75)
NRBC # BLD: 0 K/UL (ref 0–0.01)
NRBC BLD-RTO: 0 PER 100 WBC
PLATELET # BLD AUTO: 450 K/UL (ref 150–400)
PMV BLD AUTO: 9.2 FL (ref 8.9–12.9)
POTASSIUM SERPL-SCNC: 4 MMOL/L (ref 3.5–5.1)
PROT SERPL-MCNC: 7.7 G/DL (ref 6.4–8.2)
RBC # BLD AUTO: 4.74 M/UL (ref 3.8–5.2)
SODIUM SERPL-SCNC: 136 MMOL/L (ref 136–145)
SPECIMEN HOLD: NORMAL
TROPONIN I SERPL HS-MCNC: 7 NG/L (ref 0–51)
WBC # BLD AUTO: 10.5 K/UL (ref 3.6–11)

## 2023-12-03 PROCEDURE — 80053 COMPREHEN METABOLIC PANEL: CPT

## 2023-12-03 PROCEDURE — 2580000003 HC RX 258

## 2023-12-03 PROCEDURE — 96374 THER/PROPH/DIAG INJ IV PUSH: CPT

## 2023-12-03 PROCEDURE — 83735 ASSAY OF MAGNESIUM: CPT

## 2023-12-03 PROCEDURE — 99285 EMERGENCY DEPT VISIT HI MDM: CPT

## 2023-12-03 PROCEDURE — 6360000002 HC RX W HCPCS

## 2023-12-03 PROCEDURE — 85379 FIBRIN DEGRADATION QUANT: CPT

## 2023-12-03 PROCEDURE — 70450 CT HEAD/BRAIN W/O DYE: CPT

## 2023-12-03 PROCEDURE — 96375 TX/PRO/DX INJ NEW DRUG ADDON: CPT

## 2023-12-03 PROCEDURE — 84484 ASSAY OF TROPONIN QUANT: CPT

## 2023-12-03 PROCEDURE — 36415 COLL VENOUS BLD VENIPUNCTURE: CPT

## 2023-12-03 PROCEDURE — 93005 ELECTROCARDIOGRAM TRACING: CPT

## 2023-12-03 PROCEDURE — 81025 URINE PREGNANCY TEST: CPT

## 2023-12-03 PROCEDURE — 85025 COMPLETE CBC W/AUTO DIFF WBC: CPT

## 2023-12-03 PROCEDURE — 6370000000 HC RX 637 (ALT 250 FOR IP)

## 2023-12-03 PROCEDURE — 71046 X-RAY EXAM CHEST 2 VIEWS: CPT

## 2023-12-03 RX ORDER — DIPHENHYDRAMINE HYDROCHLORIDE 50 MG/ML
25 INJECTION INTRAMUSCULAR; INTRAVENOUS
Status: COMPLETED | OUTPATIENT
Start: 2023-12-03 | End: 2023-12-03

## 2023-12-03 RX ORDER — ACETAMINOPHEN 500 MG
1000 TABLET ORAL ONCE
Status: COMPLETED | OUTPATIENT
Start: 2023-12-03 | End: 2023-12-03

## 2023-12-03 RX ORDER — 0.9 % SODIUM CHLORIDE 0.9 %
1000 INTRAVENOUS SOLUTION INTRAVENOUS ONCE
Status: COMPLETED | OUTPATIENT
Start: 2023-12-03 | End: 2023-12-03

## 2023-12-03 RX ORDER — KETOROLAC TROMETHAMINE 30 MG/ML
15 INJECTION, SOLUTION INTRAMUSCULAR; INTRAVENOUS ONCE
Status: COMPLETED | OUTPATIENT
Start: 2023-12-03 | End: 2023-12-03

## 2023-12-03 RX ORDER — BUTALBITAL, ACETAMINOPHEN AND CAFFEINE 300; 40; 50 MG/1; MG/1; MG/1
1 CAPSULE ORAL EVERY 4 HOURS PRN
Qty: 12 CAPSULE | Refills: 0 | Status: SHIPPED | OUTPATIENT
Start: 2023-12-03

## 2023-12-03 RX ORDER — METOCLOPRAMIDE HYDROCHLORIDE 5 MG/ML
10 INJECTION INTRAMUSCULAR; INTRAVENOUS ONCE
Status: COMPLETED | OUTPATIENT
Start: 2023-12-03 | End: 2023-12-03

## 2023-12-03 RX ORDER — DEXAMETHASONE SODIUM PHOSPHATE 4 MG/ML
4 INJECTION, SOLUTION INTRA-ARTICULAR; INTRALESIONAL; INTRAMUSCULAR; INTRAVENOUS; SOFT TISSUE EVERY 6 HOURS
Status: DISCONTINUED | OUTPATIENT
Start: 2023-12-03 | End: 2023-12-03 | Stop reason: HOSPADM

## 2023-12-03 RX ADMIN — DEXAMETHASONE SODIUM PHOSPHATE 4 MG: 4 INJECTION, SOLUTION INTRAMUSCULAR; INTRAVENOUS at 15:19

## 2023-12-03 RX ADMIN — SODIUM CHLORIDE 1000 ML: 9 INJECTION, SOLUTION INTRAVENOUS at 13:15

## 2023-12-03 RX ADMIN — METOCLOPRAMIDE 10 MG: 5 INJECTION, SOLUTION INTRAMUSCULAR; INTRAVENOUS at 15:19

## 2023-12-03 RX ADMIN — DIPHENHYDRAMINE HYDROCHLORIDE 25 MG: 50 INJECTION, SOLUTION INTRAMUSCULAR; INTRAVENOUS at 15:19

## 2023-12-03 RX ADMIN — ACETAMINOPHEN 1000 MG: 500 TABLET ORAL at 15:19

## 2023-12-03 RX ADMIN — KETOROLAC TROMETHAMINE 15 MG: 30 INJECTION, SOLUTION INTRAMUSCULAR; INTRAVENOUS at 13:15

## 2023-12-03 ASSESSMENT — PAIN - FUNCTIONAL ASSESSMENT: PAIN_FUNCTIONAL_ASSESSMENT: 0-10

## 2023-12-03 ASSESSMENT — PAIN DESCRIPTION - LOCATION
LOCATION: HEAD

## 2023-12-03 ASSESSMENT — PAIN SCALES - GENERAL
PAINLEVEL_OUTOF10: 8
PAINLEVEL_OUTOF10: 6

## 2023-12-03 ASSESSMENT — HEART SCORE
ECG: 0
ECG: 0

## 2023-12-03 ASSESSMENT — PAIN DESCRIPTION - DESCRIPTORS: DESCRIPTORS: ACHING

## 2023-12-03 ASSESSMENT — PAIN DESCRIPTION - ORIENTATION: ORIENTATION: LEFT

## 2023-12-03 NOTE — ED PROVIDER NOTES
Negative  Negative  [TR]   1158 CBC with Auto Differential(!):    WBC 10.5   RBC 4.74   Hemoglobin Quant 13.7   Hematocrit 41.6   MCV 87.8   MCH 28.9   MCHC 32.9   RDW 13.9   Platelet Count 988(!)   MPV 9.2   Nucleated Red Blood Cells 0.0   Nucleated Red Blood Cells 0.00   Neutrophils % 63   Lymphocyte % 27   Monocytes % 5   Eosinophils % 4   Basophils % 1   Immature Granulocytes 0   Neutrophils Absolute 6.6   Lymphocytes Absolute 2.9   Monocytes Absolute 0.5   Eosinophils Absolute 0.4   Basophils Absolute 0.1   Absolute Immature Granulocyte 0.0   Differential Type AUTOMATED  Normal  [TR]   1212 D-Dimer, Quantitative:    D-Dimer, Quant 0.46  Negative  [TR]   1212 CT Head W/O Contrast  No acute abnormality  [TR]   1213 XR CHEST (2 VW)  No acute abnormality  [TR]   1238 Troponin:    Troponin, High Sensitivity 7  No significant elevation  [TR]   1304 Magnesium:    Magnesium 1.9  Normal  [TR]   1304 CMP(!):    Sodium 136   Potassium 4.0   Chloride 107   CO2 20(!)   Anion Gap 9   Glucose, Random 148(!)   BUN,BUNPL 9   Creatinine 0.62   Bun/Cre Ratio 15   Est, Glom Filt Rate >60   CALCIUM, SERUM, 024233 8.7   BILIRUBIN TOTAL 0.4   ALT 27   AST 28   Alk Phos 72   Total Protein 7.7   Albumin 3.3(!)   Globulin 4.4(!)   ALBUMIN/GLOBULIN RATIO 0.8(!)  No values requiring intervention. [TR]      ED Course User Index  [AL] Gonzalo Hylton MD  [TR] Estefani Gomez PA-C           CONSULTS:  None    PROCEDURES:  Unless otherwise noted below, none     Procedures      FINAL IMPRESSION      1. Acute nonintractable headache, unspecified headache type    2.  Chest pain, unspecified type          DISPOSITION/PLAN   DISPOSITION Decision To Discharge 12/03/2023 03:50:46 PM      PATIENT REFERRED TO:  MD Matt [address]oy [address]  [address] [facility]  [phone]    Go in 4 days      [facility] EMERGENCY DEP  [address]  [phone]  Go to   If symptoms worsen      DISCHARGE MEDICATIONS:  Discharge

## 2023-12-03 NOTE — DISCHARGE INSTRUCTIONS
You were seen today in the emergency department for chest pain and headache. Your workup was reassuring. Your chest x-ray was clear and your head CT showed no abnormality. Your cardiac enzyme was not elevated. Your blood work showed normal electrolytes, no evidence of anemia or infection. Headache is likely due to migraine headache versus tension headache. I prescribed a medication you can take for the headache at home as needed. You should follow-up with her primary care provider within 1 week.   You should return to the emergency department for any new or worsening symptoms or

## 2023-12-03 NOTE — ED TRIAGE NOTES
Pt arrives from home for left sided headache involving whole head, face, and eye. Pt also has heavy pain on left chest. Pt fell down stairs a week ago.

## 2023-12-03 NOTE — ED NOTES
Patient received discharge instructions and verbalized understanding. She left ambulatory in no acute distress.       Jaimie Friend RN  12/03/23 0990

## 2024-02-09 ENCOUNTER — HOSPITAL ENCOUNTER (OUTPATIENT)
Facility: HOSPITAL | Age: 44
End: 2024-02-09
Attending: OTOLARYNGOLOGY
Payer: COMMERCIAL

## 2024-02-09 DIAGNOSIS — R22.1 NECK MASS: ICD-10-CM

## 2024-02-09 DIAGNOSIS — Z78.9 NON-SMOKER: ICD-10-CM

## 2024-02-09 DIAGNOSIS — Z00.00 ENCOUNTER FOR GENERAL MEDICAL EXAMINATION: ICD-10-CM

## 2024-02-09 PROCEDURE — 70491 CT SOFT TISSUE NECK W/DYE: CPT

## 2024-02-09 PROCEDURE — 6360000004 HC RX CONTRAST MEDICATION: Performed by: RADIOLOGY

## 2024-02-09 RX ADMIN — IOPAMIDOL 100 ML: 612 INJECTION, SOLUTION INTRAVENOUS at 17:49

## 2024-02-28 DIAGNOSIS — J30.9 ALLERGIC RHINITIS: ICD-10-CM

## 2024-02-28 DIAGNOSIS — J30.9 ALLERGIC RHINITIS, UNSPECIFIED: ICD-10-CM

## 2024-02-28 RX ORDER — FLUTICASONE PROPIONATE 50 MCG
2 SPRAY, SUSPENSION (ML) NASAL DAILY
Qty: 18.2 EACH | Refills: 1 | Status: SHIPPED | OUTPATIENT
Start: 2024-02-28

## 2024-02-28 NOTE — TELEPHONE ENCOUNTER
Last appt 9/20/2023      Next Apt:     Future Appointments   Date Time Provider Department Center   3/20/2024  9:30 AM Gale Chris MD S[facility]I BS AMB

## 2024-03-07 DIAGNOSIS — M89.8X1 OTHER SPECIFIED DISORDERS OF BONE, SHOULDER: ICD-10-CM

## 2024-03-07 RX ORDER — DICLOFENAC SODIUM 75 MG/1
75 TABLET, DELAYED RELEASE ORAL 2 TIMES DAILY
Qty: 20 TABLET | Refills: 1 | Status: SHIPPED | OUTPATIENT
Start: 2024-03-07

## 2024-03-14 ENCOUNTER — HOSPITAL ENCOUNTER (OUTPATIENT)
Facility: HOSPITAL | Age: 44
End: 2024-03-14
Attending: OTOLARYNGOLOGY
Payer: COMMERCIAL

## 2024-03-14 DIAGNOSIS — E04.1 LEFT THYROID NODULE: ICD-10-CM

## 2024-03-14 PROCEDURE — 10005 FNA BX W/US GDN 1ST LES: CPT

## 2024-03-14 PROCEDURE — 88172 CYTP DX EVAL FNA 1ST EA SITE: CPT

## 2024-03-14 PROCEDURE — 88173 CYTOPATH EVAL FNA REPORT: CPT

## 2024-03-20 ENCOUNTER — OFFICE VISIT (OUTPATIENT)
Age: 44
End: 2024-03-20
Payer: COMMERCIAL

## 2024-03-20 ENCOUNTER — HOSPITAL ENCOUNTER (OUTPATIENT)
Facility: HOSPITAL | Age: 44
Discharge: HOME | End: 2024-03-23
Payer: COMMERCIAL

## 2024-03-20 VITALS
BODY MASS INDEX: 27.94 KG/M2 | SYSTOLIC BLOOD PRESSURE: 110 MMHG | WEIGHT: 148 LBS | OXYGEN SATURATION: 98 % | HEART RATE: 92 BPM | RESPIRATION RATE: 16 BRPM | HEIGHT: 61 IN | TEMPERATURE: 97 F | DIASTOLIC BLOOD PRESSURE: 72 MMHG

## 2024-03-20 DIAGNOSIS — G44.219 EPISODIC TENSION-TYPE HEADACHE, NOT INTRACTABLE: ICD-10-CM

## 2024-03-20 DIAGNOSIS — M54.2 NECK PAIN: ICD-10-CM

## 2024-03-20 DIAGNOSIS — Z12.31 ENCOUNTER FOR SCREENING MAMMOGRAM FOR MALIGNANT NEOPLASM OF BREAST: ICD-10-CM

## 2024-03-20 DIAGNOSIS — F32.1 MAJOR DEPRESSIVE DISORDER, SINGLE EPISODE, MODERATE (HCC): ICD-10-CM

## 2024-03-20 DIAGNOSIS — M25.562 PAIN IN BOTH KNEES, UNSPECIFIED CHRONICITY: ICD-10-CM

## 2024-03-20 DIAGNOSIS — E11.9 TYPE 2 DIABETES MELLITUS WITHOUT COMPLICATION, WITHOUT LONG-TERM CURRENT USE OF INSULIN (HCC): Primary | ICD-10-CM

## 2024-03-20 DIAGNOSIS — E03.8 HYPOTHYROIDISM DUE TO HASHIMOTO'S THYROIDITIS: ICD-10-CM

## 2024-03-20 DIAGNOSIS — M25.561 PAIN IN BOTH KNEES, UNSPECIFIED CHRONICITY: ICD-10-CM

## 2024-03-20 DIAGNOSIS — E55.9 VITAMIN D DEFICIENCY: ICD-10-CM

## 2024-03-20 DIAGNOSIS — E06.3 HYPOTHYROIDISM DUE TO HASHIMOTO'S THYROIDITIS: ICD-10-CM

## 2024-03-20 LAB
BASOPHILS # BLD AUTO: 0 X10E3/UL (ref 0–0.2)
BASOPHILS NFR BLD AUTO: 1 %
EOSINOPHIL # BLD AUTO: 0.3 X10E3/UL (ref 0–0.4)
EOSINOPHIL NFR BLD AUTO: 5 %
ERYTHROCYTE [DISTWIDTH] IN BLOOD BY AUTOMATED COUNT: 13.2 % (ref 11.7–15.4)
HCT VFR BLD AUTO: 39.1 % (ref 34–46.6)
HGB BLD-MCNC: 12.8 G/DL (ref 11.1–15.9)
IMM GRANULOCYTES # BLD AUTO: 0 X10E3/UL (ref 0–0.1)
IMM GRANULOCYTES NFR BLD AUTO: 0 %
LYMPHOCYTES # BLD AUTO: 2.1 X10E3/UL (ref 0.7–3.1)
LYMPHOCYTES NFR BLD AUTO: 34 %
MCH RBC QN AUTO: 29.8 PG (ref 26.6–33)
MCHC RBC AUTO-ENTMCNC: 32.7 G/DL (ref 31.5–35.7)
MCV RBC AUTO: 91 FL (ref 79–97)
MONOCYTES # BLD AUTO: 0.4 X10E3/UL (ref 0.1–0.9)
MONOCYTES NFR BLD AUTO: 6 %
NEUTROPHILS # BLD AUTO: 3.3 X10E3/UL (ref 1.4–7)
NEUTROPHILS NFR BLD AUTO: 54 %
PLATELET # BLD AUTO: 375 X10E3/UL (ref 150–450)
RBC # BLD AUTO: 4.3 X10E6/UL (ref 3.77–5.28)
WBC # BLD AUTO: 6.1 X10E3/UL (ref 3.4–10.8)

## 2024-03-20 PROCEDURE — 72050 X-RAY EXAM NECK SPINE 4/5VWS: CPT

## 2024-03-20 PROCEDURE — 99214 OFFICE O/P EST MOD 30 MIN: CPT | Performed by: INTERNAL MEDICINE

## 2024-03-20 RX ORDER — LEVOTHYROXINE SODIUM 0.03 MG/1
25 TABLET ORAL
Qty: 90 TABLET | Refills: 1 | Status: SHIPPED | OUTPATIENT
Start: 2024-03-20

## 2024-03-20 RX ORDER — BUPROPION HYDROCHLORIDE 100 MG/1
100 TABLET, EXTENDED RELEASE ORAL DAILY
Qty: 90 TABLET | Refills: 1 | Status: SHIPPED | OUTPATIENT
Start: 2024-03-20

## 2024-03-20 RX ORDER — BUTALBITAL, ACETAMINOPHEN AND CAFFEINE 300; 40; 50 MG/1; MG/1; MG/1
1 CAPSULE ORAL DAILY PRN
Qty: 30 CAPSULE | Refills: 0 | Status: SHIPPED | OUTPATIENT
Start: 2024-03-20

## 2024-03-20 RX ORDER — METFORMIN HYDROCHLORIDE 500 MG/1
500 TABLET, EXTENDED RELEASE ORAL
Qty: 90 TABLET | Refills: 1 | Status: SHIPPED | OUTPATIENT
Start: 2024-03-20 | End: 2024-03-22 | Stop reason: DRUGHIGH

## 2024-03-20 ASSESSMENT — PATIENT HEALTH QUESTIONNAIRE - PHQ9
6. FEELING BAD ABOUT YOURSELF - OR THAT YOU ARE A FAILURE OR HAVE LET YOURSELF OR YOUR FAMILY DOWN: NOT AT ALL
5. POOR APPETITE OR OVEREATING: NOT AT ALL
2. FEELING DOWN, DEPRESSED OR HOPELESS: NOT AT ALL
9. THOUGHTS THAT YOU WOULD BE BETTER OFF DEAD, OR OF HURTING YOURSELF: NOT AT ALL
1. LITTLE INTEREST OR PLEASURE IN DOING THINGS: NOT AT ALL
SUM OF ALL RESPONSES TO PHQ QUESTIONS 1-9: 0
SUM OF ALL RESPONSES TO PHQ QUESTIONS 1-9: 0
7. TROUBLE CONCENTRATING ON THINGS, SUCH AS READING THE NEWSPAPER OR WATCHING TELEVISION: NOT AT ALL
SUM OF ALL RESPONSES TO PHQ9 QUESTIONS 1 & 2: 0
10. IF YOU CHECKED OFF ANY PROBLEMS, HOW DIFFICULT HAVE THESE PROBLEMS MADE IT FOR YOU TO DO YOUR WORK, TAKE CARE OF THINGS AT HOME, OR GET ALONG WITH OTHER PEOPLE: NOT DIFFICULT AT ALL
8. MOVING OR SPEAKING SO SLOWLY THAT OTHER PEOPLE COULD HAVE NOTICED. OR THE OPPOSITE, BEING SO FIGETY OR RESTLESS THAT YOU HAVE BEEN MOVING AROUND A LOT MORE THAN USUAL: NOT AT ALL
SUM OF ALL RESPONSES TO PHQ QUESTIONS 1-9: 0
SUM OF ALL RESPONSES TO PHQ QUESTIONS 1-9: 0
3. TROUBLE FALLING OR STAYING ASLEEP: NOT AT ALL
4. FEELING TIRED OR HAVING LITTLE ENERGY: NOT AT ALL

## 2024-03-20 ASSESSMENT — ENCOUNTER SYMPTOMS
GASTROINTESTINAL NEGATIVE: 1
RESPIRATORY NEGATIVE: 1
EYES NEGATIVE: 1

## 2024-03-20 NOTE — PROGRESS NOTES
Subjective:      Patient ID: Lorena Short is a 43 y.o. female here for follow-up.  She has seen ENT recently with an asymmetric thyroid lobe, had an CT neck done along with FNA C, came back negative for any cancer.  She is diabetic, taking metformin.  Not monitoring blood sugar.  Eye checkup up-to-date.  Has hypothyroid, on Synthroid.  Need lab work.  Suffer from headache, needed refill on Fioricet.  Report neck pain radiating to left side.  Sometimes it comes all the way to the arm.  No fall or injury.  Depression seems under control.  Need mammogram.    Diabetes    Migraine     Review of Systems   Constitutional: Negative.    HENT: Negative.     Eyes: Negative.    Respiratory: Negative.     Cardiovascular: Negative.    Gastrointestinal: Negative.    Endocrine: Negative.    Genitourinary: Negative.    Musculoskeletal: Negative.    Neurological: Negative.    Hematological: Negative.    Psychiatric/Behavioral: Negative.       Objective:   Physical Exam  Constitutional:       Appearance: Normal appearance.   Cardiovascular:      Rate and Rhythm: Normal rate and regular rhythm.      Pulses: Normal pulses.      Heart sounds: Normal heart sounds.   Pulmonary:      Effort: Pulmonary effort is normal.      Breath sounds: Normal breath sounds.   Abdominal:      General: Abdomen is flat. Bowel sounds are normal.      Palpations: Abdomen is soft.   Musculoskeletal:         General: Tenderness present.      Cervical back: Normal range of motion and neck supple.      Comments: Neck: Spine nontender.  Paravertebral muscle spasm on left side.  Range of motion mildly restricted.  Bilateral knee: Mild tenderness present range of motion okay.   Skin:     General: Skin is warm.   Neurological:      Mental Status: She is alert.   Psychiatric:         Mood and Affect: Mood normal.         Behavior: Behavior normal.         Thought Content: Thought content normal.     Assessment / Plan:         Diagnoses and all orders for this

## 2024-03-20 NOTE — PROGRESS NOTES
Nursing staff confirmed patient with full name and . Prepared patient for visit today by obtaining vitals, verifying medication list and allergies, and briefly discussing reason for visit.       Chief Complaint   Patient presents with    Diabetes    Migraine    Hypothyroidism    Follow-up Chronic Condition       1. \"Have you been to the ER, urgent care clinic since your last visit?  Hospitalized since your last visit?no    2. \"Have you seen or consulted any other health care providers outside of the [facility] since your last visit?\" non    5. For patients aged 21-65: Has the patient had a pap smear? no

## 2024-03-21 LAB
25(OH)D3+25(OH)D2 SERPL-MCNC: 25.5 NG/ML (ref 30–100)
ALBUMIN SERPL-MCNC: 4 G/DL (ref 3.9–4.9)
ALBUMIN/GLOB SERPL: 1.5 {RATIO} (ref 1.2–2.2)
ALP SERPL-CCNC: 67 IU/L (ref 44–121)
ALT SERPL-CCNC: 15 IU/L (ref 0–32)
AST SERPL-CCNC: 15 IU/L (ref 0–40)
BILIRUB SERPL-MCNC: 0.2 MG/DL (ref 0–1.2)
BUN SERPL-MCNC: 9 MG/DL (ref 6–24)
BUN/CREAT SERPL: 18 (ref 9–23)
CALCIUM SERPL-MCNC: 8.9 MG/DL (ref 8.7–10.2)
CHLORIDE SERPL-SCNC: 104 MMOL/L (ref 96–106)
CHOLEST SERPL-MCNC: 189 MG/DL (ref 100–199)
CO2 SERPL-SCNC: 20 MMOL/L (ref 20–29)
CREAT SERPL-MCNC: 0.49 MG/DL (ref 0.57–1)
EGFRCR SERPLBLD CKD-EPI 2021: 121 ML/MIN/1.73
GLOBULIN SER CALC-MCNC: 2.6 G/DL (ref 1.5–4.5)
GLUCOSE SERPL-MCNC: 138 MG/DL (ref 70–99)
HBA1C MFR BLD: 6.6 % (ref 4.8–5.6)
HDLC SERPL-MCNC: 40 MG/DL
IMP & REVIEW OF LAB RESULTS: NORMAL
LDLC SERPL CALC-MCNC: 124 MG/DL (ref 0–99)
Lab: NORMAL
POTASSIUM SERPL-SCNC: 4.5 MMOL/L (ref 3.5–5.2)
PROT SERPL-MCNC: 6.6 G/DL (ref 6–8.5)
SODIUM SERPL-SCNC: 138 MMOL/L (ref 134–144)
TRIGL SERPL-MCNC: 137 MG/DL (ref 0–149)
TSH SERPL DL<=0.005 MIU/L-ACNC: 1.67 UIU/ML (ref 0.45–4.5)
VLDLC SERPL CALC-MCNC: 25 MG/DL (ref 5–40)

## 2024-03-22 DIAGNOSIS — E11.9 TYPE 2 DIABETES MELLITUS WITHOUT COMPLICATION, WITHOUT LONG-TERM CURRENT USE OF INSULIN (HCC): Primary | ICD-10-CM

## 2024-03-22 DIAGNOSIS — M47.812 CERVICAL ARTHRITIS: Primary | ICD-10-CM

## 2024-03-22 RX ORDER — METFORMIN HYDROCHLORIDE 750 MG/1
750 TABLET, EXTENDED RELEASE ORAL
Qty: 90 TABLET | Refills: 1 | Status: SHIPPED | OUTPATIENT
Start: 2024-03-22

## 2024-03-22 NOTE — TELEPHONE ENCOUNTER
----- Message from Gale Chris MD sent at 3/22/2024 12:33 PM EDT -----  Low vitamin D, advised to take vitamin D3 1000 units once a day for 4 months.  A1c 6.6.  Diabetic, on we can change her metformin to metformin  mg once a day.  Cholesterol has improved but LDL slightly elevated.  Need to watch fatty food and exercise.all other labs are stable.

## 2024-04-01 ENCOUNTER — TELEPHONE (OUTPATIENT)
Age: 44
End: 2024-04-01

## 2024-04-01 NOTE — TELEPHONE ENCOUNTER
----- Message from Dayan Benton MA sent at 4/1/2024  9:22 AM EDT -----  Subject: Message to Provider    QUESTIONS  Information for Provider? Pts  MD Ortega called and stated that he   would like to speak to a nurse regarding the pt. Md. states that the pt   has been headaches for the last 4 days and the headaches have been   starting around the same time in the AM. Please call MD back  ---------------------------------------------------------------------------  --------------  CALL BACK INFO  [phone]; OK to leave message on voicemail  ---------------------------------------------------------------------------  --------------  SCRIPT ANSWERS  Relationship to Patient? Spouse/Partner  Representative Name? MD. Ortega  Is the representative on the Communication Release of Information (NAREN)   form in Epic? Yes

## 2024-04-02 NOTE — TELEPHONE ENCOUNTER
Needs apt:    4/5/2024  9:45 AM Gale Chris MD          Principal Discharge DX:	ACS (acute coronary syndrome)

## 2024-04-05 ENCOUNTER — TELEPHONE (OUTPATIENT)
Age: 44
End: 2024-04-05

## 2024-04-05 ENCOUNTER — TELEMEDICINE (OUTPATIENT)
Age: 44
End: 2024-04-05
Payer: COMMERCIAL

## 2024-04-05 DIAGNOSIS — E11.9 TYPE 2 DIABETES MELLITUS WITHOUT COMPLICATION, WITHOUT LONG-TERM CURRENT USE OF INSULIN (HCC): ICD-10-CM

## 2024-04-05 DIAGNOSIS — E55.9 VITAMIN D DEFICIENCY: ICD-10-CM

## 2024-04-05 DIAGNOSIS — G43.809 OTHER MIGRAINE WITHOUT STATUS MIGRAINOSUS, NOT INTRACTABLE: Primary | ICD-10-CM

## 2024-04-05 DIAGNOSIS — F32.1 MAJOR DEPRESSIVE DISORDER, SINGLE EPISODE, MODERATE (HCC): ICD-10-CM

## 2024-04-05 PROCEDURE — 3044F HG A1C LEVEL LT 7.0%: CPT | Performed by: INTERNAL MEDICINE

## 2024-04-05 PROCEDURE — 99214 OFFICE O/P EST MOD 30 MIN: CPT | Performed by: INTERNAL MEDICINE

## 2024-04-05 RX ORDER — RIZATRIPTAN BENZOATE 10 MG/1
10 TABLET ORAL DAILY PRN
Qty: 12 TABLET | Refills: 3 | Status: SHIPPED | OUTPATIENT
Start: 2024-04-05

## 2024-04-05 ASSESSMENT — PATIENT HEALTH QUESTIONNAIRE - PHQ9
10. IF YOU CHECKED OFF ANY PROBLEMS, HOW DIFFICULT HAVE THESE PROBLEMS MADE IT FOR YOU TO DO YOUR WORK, TAKE CARE OF THINGS AT HOME, OR GET ALONG WITH OTHER PEOPLE: NOT DIFFICULT AT ALL
6. FEELING BAD ABOUT YOURSELF - OR THAT YOU ARE A FAILURE OR HAVE LET YOURSELF OR YOUR FAMILY DOWN: NOT AT ALL
SUM OF ALL RESPONSES TO PHQ QUESTIONS 1-9: 0
2. FEELING DOWN, DEPRESSED OR HOPELESS: NOT AT ALL
SUM OF ALL RESPONSES TO PHQ QUESTIONS 1-9: 0
4. FEELING TIRED OR HAVING LITTLE ENERGY: NOT AT ALL
SUM OF ALL RESPONSES TO PHQ9 QUESTIONS 1 & 2: 0
5. POOR APPETITE OR OVEREATING: NOT AT ALL
SUM OF ALL RESPONSES TO PHQ QUESTIONS 1-9: 0
9. THOUGHTS THAT YOU WOULD BE BETTER OFF DEAD, OR OF HURTING YOURSELF: NOT AT ALL
SUM OF ALL RESPONSES TO PHQ QUESTIONS 1-9: 0
7. TROUBLE CONCENTRATING ON THINGS, SUCH AS READING THE NEWSPAPER OR WATCHING TELEVISION: NOT AT ALL
1. LITTLE INTEREST OR PLEASURE IN DOING THINGS: NOT AT ALL
3. TROUBLE FALLING OR STAYING ASLEEP: NOT AT ALL
8. MOVING OR SPEAKING SO SLOWLY THAT OTHER PEOPLE COULD HAVE NOTICED. OR THE OPPOSITE, BEING SO FIGETY OR RESTLESS THAT YOU HAVE BEEN MOVING AROUND A LOT MORE THAN USUAL: NOT AT ALL

## 2024-04-05 NOTE — PROGRESS NOTES
Lorena Short, was evaluated through a synchronous (real-time) audio-video encounter. The patient (or guardian if applicable) is aware that this is a billable service, which includes applicable co-pays. This Virtual Visit was conducted with patient's (and/or legal guardian's) consent. Patient identification was verified, and a caregiver was present when appropriate.   The patient was located at Home: [address]  [place] [address]  Provider was located at Facility (Appt Dept): [address]  [address]  [place] [address]  Confirm you are appropriately licensed, registered, or certified to deliver care in the state where the patient is located as indicated above. If you are not or unsure, please re-schedule the visit: Yes, I confirm.     Lorena Short (:  1980) is a Established patient, presenting virtually for evaluation of the following:    Assessment & Plan   Below is the assessment and plan developed based on review of pertinent history, physical exam, labs, studies, and medications.  1. Other migraine without status migrainosus, not intractable    Unilateral throbbing headache.  Sometimes the light bothers her.  Will try,  -     rizatriptan (MAXALT) 10 MG tablet; Take 1 tablet by mouth daily as needed for Migraine May repeat in 2 hours if needed, Disp-12 tablet, R-3Normal  2. Vitamin D deficiency    Vitamin D level slightly low.  Advised to have a morning sunlight exposure.  Will give,  -     vitamin D (CHOLECALCIFEROL) 25 MCG (1000 UT) TABS tablet; Take 1 tablet by mouth daily, Disp-90 tablet, R-1Normal  3. Major depressive disorder, single episode, moderate (HCC)    She is taking Wellbutrin XL.  Doing well.  4. Type 2 diabetes mellitus without complication, without long-term current use of insulin (HCC)  A1c 6.6.  Started on metformin XL.  Able to tolerate it well.  She is not monitoring blood sugar.  No follow-ups on file.       Subjective   Ms. Short is here for follow-up.

## 2024-04-05 NOTE — TELEPHONE ENCOUNTER
----- Message from Magali Perez sent at 4/5/2024  9:27 AM EDT -----  Subject: Message to Provider    QUESTIONS  Information for Provider? URGENT? Patient is having trouble getting   connected for her virtual visit. Please call patient asap.  ---------------------------------------------------------------------------  --------------  CALL BACK INFO  [phone]; OK to leave message on voicemail  ---------------------------------------------------------------------------  --------------  SCRIPT ANSWERS  Relationship to Patient? Self

## 2024-06-06 DIAGNOSIS — F32.1 MAJOR DEPRESSIVE DISORDER, SINGLE EPISODE, MODERATE (HCC): ICD-10-CM

## 2024-06-06 RX ORDER — NORETHINDRONE ACETATE AND ETHINYL ESTRADIOL, AND FERROUS FUMARATE 1MG-20(24)
1 KIT ORAL DAILY
Qty: 84 TABLET | Refills: 3 | OUTPATIENT
Start: 2024-06-06

## 2024-07-23 DIAGNOSIS — F32.1 MAJOR DEPRESSIVE DISORDER, SINGLE EPISODE, MODERATE (HCC): ICD-10-CM

## 2024-07-23 RX ORDER — NORETHINDRONE ACETATE AND ETHINYL ESTRADIOL, AND FERROUS FUMARATE 1MG-20(24)
1 KIT ORAL DAILY
Qty: 84 TABLET | Refills: 3 | OUTPATIENT
Start: 2024-07-23

## 2024-07-25 DIAGNOSIS — F32.1 MAJOR DEPRESSIVE DISORDER, SINGLE EPISODE, MODERATE (HCC): ICD-10-CM

## 2024-07-25 RX ORDER — NORETHINDRONE ACETATE AND ETHINYL ESTRADIOL, AND FERROUS FUMARATE 1MG-20(24)
1 KIT ORAL DAILY
Qty: 28 PACKET | Refills: 0 | Status: SHIPPED | OUTPATIENT
Start: 2024-07-25

## 2024-08-26 ENCOUNTER — TELEPHONE (OUTPATIENT)
Age: 44
End: 2024-08-26

## 2024-08-26 ENCOUNTER — OFFICE VISIT (OUTPATIENT)
Age: 44
End: 2024-08-26
Payer: COMMERCIAL

## 2024-08-26 VITALS
BODY MASS INDEX: 28.51 KG/M2 | DIASTOLIC BLOOD PRESSURE: 64 MMHG | OXYGEN SATURATION: 96 % | HEART RATE: 102 BPM | TEMPERATURE: 98.7 F | WEIGHT: 151 LBS | SYSTOLIC BLOOD PRESSURE: 103 MMHG | HEIGHT: 61 IN

## 2024-08-26 DIAGNOSIS — Z01.419 ENCOUNTER FOR GYNECOLOGICAL EXAMINATION: Primary | ICD-10-CM

## 2024-08-26 DIAGNOSIS — Z12.31 BREAST CANCER SCREENING BY MAMMOGRAM: ICD-10-CM

## 2024-08-26 PROCEDURE — 99396 PREV VISIT EST AGE 40-64: CPT | Performed by: OBSTETRICS & GYNECOLOGY

## 2024-08-26 RX ORDER — MEDROXYPROGESTERONE ACETATE 150 MG/ML
150 INJECTION, SUSPENSION INTRAMUSCULAR
Qty: 1 ML | Refills: 3
Start: 2024-08-26

## 2024-08-26 ASSESSMENT — PATIENT HEALTH QUESTIONNAIRE - PHQ9
8. MOVING OR SPEAKING SO SLOWLY THAT OTHER PEOPLE COULD HAVE NOTICED. OR THE OPPOSITE, BEING SO FIGETY OR RESTLESS THAT YOU HAVE BEEN MOVING AROUND A LOT MORE THAN USUAL: SEVERAL DAYS
2. FEELING DOWN, DEPRESSED OR HOPELESS: NOT AT ALL
SUM OF ALL RESPONSES TO PHQ QUESTIONS 1-9: 12
10. IF YOU CHECKED OFF ANY PROBLEMS, HOW DIFFICULT HAVE THESE PROBLEMS MADE IT FOR YOU TO DO YOUR WORK, TAKE CARE OF THINGS AT HOME, OR GET ALONG WITH OTHER PEOPLE: SOMEWHAT DIFFICULT
SUM OF ALL RESPONSES TO PHQ QUESTIONS 1-9: 12
6. FEELING BAD ABOUT YOURSELF - OR THAT YOU ARE A FAILURE OR HAVE LET YOURSELF OR YOUR FAMILY DOWN: NOT AT ALL
SUM OF ALL RESPONSES TO PHQ QUESTIONS 1-9: 12
9. THOUGHTS THAT YOU WOULD BE BETTER OFF DEAD, OR OF HURTING YOURSELF: NOT AT ALL
SUM OF ALL RESPONSES TO PHQ9 QUESTIONS 1 & 2: 3
7. TROUBLE CONCENTRATING ON THINGS, SUCH AS READING THE NEWSPAPER OR WATCHING TELEVISION: NOT AT ALL
3. TROUBLE FALLING OR STAYING ASLEEP: NEARLY EVERY DAY
4. FEELING TIRED OR HAVING LITTLE ENERGY: MORE THAN HALF THE DAYS
5. POOR APPETITE OR OVEREATING: NEARLY EVERY DAY
SUM OF ALL RESPONSES TO PHQ QUESTIONS 1-9: 12
1. LITTLE INTEREST OR PLEASURE IN DOING THINGS: NEARLY EVERY DAY

## 2024-08-26 NOTE — PROGRESS NOTES
Chief Complaint   Patient presents with    Annual Exam       Ob/Gyn Hx:  A2- 2 vaginal deliveries.   LMP - Patient's last menstrual period was 2024 (exact date).  Menses - regular   Contraception - OCP (estrogen/progesterone).  Hx of STI - No    SA - Yes, male partner.    Health maintenance:  Last Pap: never had one.  Last Mammogram: 1091-Np-Njdh 1  Last Bone Density: N/A  Last colonoscopy: N/A    1. Have you been to the ER, urgent care clinic, or hospitalized since your last visit?Yes see encounter.    2. Have you seen or consulted any other health care providers outside of the [facility] since your last visit? No    She declines  a chaperone during the gynecologic exam today.      Lacey Hyman MA

## 2024-08-26 NOTE — TELEPHONE ENCOUNTER
Called and spoke with patients . Informed him that Dr. Morley will be sending in the patient depo to see if it will help with her bleeding she is having. He is aware they need to  the medication from the pharmacy and call the office to have a nurse only visit scheduled.

## 2024-08-26 NOTE — PROGRESS NOTES
Annual Exam    Chief Complaint   Patient presents with    Annual Exam     Lorena Short is a 43 y.o.  presenting for annual exam. Her main concerns today include follow up bleeding. See note below.    She has never had a pap test.  Her last mammogram was in 2022- [facility] 1.    She is sexually active with a male partner. They do not use contraception.    She was last seen in May for a problem visit. Her main concern at that time was heavier cycles and follow up for pelvic ultrasound. Per OV, \"she was last seen on 23 for the same problem. At that time, she noted that she has been having heavier cycles for the last 6 months. Prior to the last 6 months, she had monthly cycles, some were heavier, some were lighter. She notes that her cycle are now consistently heavy. She has to change her pad about every 2 hours. Cycles last for approximately 7 days. She also will sometimes have more than one cycle in a month.     Ultrasound previously obtained demonstrated submucosal vs intramural fibroid    We discussed contraceptive options to help with bleeding. She was started on OCPs however bleeding worsened on OCPs. She is having very irregular bleeding with passage of clots.    OB History    Para Term  AB Living   4 2 2   2     SAB IAB Ectopic Molar Multiple Live Births     1 1            # Outcome Date GA Lbr Wolfgang/2nd Weight Sex Type Anes PTL Lv   4 Ectopic            3 IAB            2 Term            1 Term            2 prior c/s      Past Medical History:   Diagnosis Date    Depression        Past Surgical History:   Procedure Laterality Date     SECTION      x2    DILATION AND CURETTAGE OF UTERUS      PELVIC LAPAROSCOPY      SALPINGO-OOPHORECTOMY      left ,for ectopic pregnancy       Family History   Problem Relation Age of Onset    Heart Disease Sister     Diabetes Mother     Heart Disease Mother     No Known Problems Father     Heart Disease Brother        Social History     Socioeconomic

## 2024-08-29 LAB
CYTOLOGIST CVX/VAG CYTO: NORMAL
CYTOLOGY CVX/VAG DOC CYTO: NORMAL
CYTOLOGY CVX/VAG DOC THIN PREP: NORMAL
DX ICD CODE: NORMAL
HPV GENOTYPE REFLEX: NORMAL
HPV I/H RISK 4 DNA CVX QL PROBE+SIG AMP: NEGATIVE
Lab: NORMAL
OTHER STN SPEC: NORMAL
STAT OF ADQ CVX/VAG CYTO-IMP: NORMAL

## 2024-09-30 DIAGNOSIS — F32.1 MAJOR DEPRESSIVE DISORDER, SINGLE EPISODE, MODERATE (HCC): ICD-10-CM

## 2024-10-01 RX ORDER — NORETHINDRONE ACETATE AND ETHINYL ESTRADIOL, AND FERROUS FUMARATE 1MG-20(24)
1 KIT ORAL DAILY
Qty: 28 TABLET | OUTPATIENT
Start: 2024-10-01

## 2024-12-11 DIAGNOSIS — E06.3 HYPOTHYROIDISM DUE TO HASHIMOTO'S THYROIDITIS: ICD-10-CM

## 2024-12-11 RX ORDER — LEVOTHYROXINE SODIUM 25 UG/1
25 TABLET ORAL
Qty: 90 TABLET | Refills: 1 | Status: SHIPPED | OUTPATIENT
Start: 2024-12-11

## 2025-01-22 ENCOUNTER — OFFICE VISIT (OUTPATIENT)
Age: 45
End: 2025-01-22
Payer: COMMERCIAL

## 2025-01-22 VITALS
HEART RATE: 84 BPM | TEMPERATURE: 98 F | OXYGEN SATURATION: 98 % | WEIGHT: 150 LBS | RESPIRATION RATE: 16 BRPM | DIASTOLIC BLOOD PRESSURE: 88 MMHG | HEIGHT: 61 IN | BODY MASS INDEX: 28.32 KG/M2 | SYSTOLIC BLOOD PRESSURE: 120 MMHG

## 2025-01-22 DIAGNOSIS — R10.13 DYSPEPSIA: ICD-10-CM

## 2025-01-22 DIAGNOSIS — J30.1 ALLERGIC RHINITIS DUE TO POLLEN, UNSPECIFIED SEASONALITY: ICD-10-CM

## 2025-01-22 DIAGNOSIS — L72.3 SEBACEOUS CYST: ICD-10-CM

## 2025-01-22 DIAGNOSIS — F32.1 MAJOR DEPRESSIVE DISORDER, SINGLE EPISODE, MODERATE (HCC): ICD-10-CM

## 2025-01-22 DIAGNOSIS — E06.3 HYPOTHYROIDISM DUE TO HASHIMOTO'S THYROIDITIS: ICD-10-CM

## 2025-01-22 DIAGNOSIS — E55.9 VITAMIN D DEFICIENCY: ICD-10-CM

## 2025-01-22 DIAGNOSIS — G43.809 OTHER MIGRAINE WITHOUT STATUS MIGRAINOSUS, NOT INTRACTABLE: ICD-10-CM

## 2025-01-22 DIAGNOSIS — E11.9 TYPE 2 DIABETES MELLITUS WITHOUT COMPLICATION, WITHOUT LONG-TERM CURRENT USE OF INSULIN (HCC): Primary | ICD-10-CM

## 2025-01-22 LAB — HBA1C MFR BLD: 7.1 % (ref 4.8–5.6)

## 2025-01-22 PROCEDURE — 99214 OFFICE O/P EST MOD 30 MIN: CPT | Performed by: INTERNAL MEDICINE

## 2025-01-22 RX ORDER — FEXOFENADINE HCL 180 MG/1
180 TABLET ORAL DAILY PRN
Qty: 30 TABLET | Refills: 1 | Status: SHIPPED | OUTPATIENT
Start: 2025-01-22

## 2025-01-22 RX ORDER — RIZATRIPTAN BENZOATE 10 MG/1
10 TABLET ORAL DAILY PRN
Qty: 12 TABLET | Refills: 3 | Status: SHIPPED | OUTPATIENT
Start: 2025-01-22

## 2025-01-22 RX ORDER — FLUTICASONE PROPIONATE 50 MCG
2 SPRAY, SUSPENSION (ML) NASAL DAILY
Qty: 18.2 EACH | Refills: 1 | Status: SHIPPED | OUTPATIENT
Start: 2025-01-22

## 2025-01-22 RX ORDER — LEVOTHYROXINE SODIUM 25 UG/1
25 TABLET ORAL
Qty: 90 TABLET | Refills: 1 | Status: SHIPPED | OUTPATIENT
Start: 2025-01-22

## 2025-01-22 RX ORDER — BUPROPION HYDROCHLORIDE 100 MG/1
100 TABLET, EXTENDED RELEASE ORAL DAILY
Qty: 90 TABLET | Refills: 1 | Status: SHIPPED | OUTPATIENT
Start: 2025-01-22

## 2025-01-22 RX ORDER — METFORMIN HYDROCHLORIDE 750 MG/1
750 TABLET, EXTENDED RELEASE ORAL
Qty: 90 TABLET | Refills: 1 | Status: SHIPPED | OUTPATIENT
Start: 2025-01-22

## 2025-01-22 SDOH — ECONOMIC STABILITY: FOOD INSECURITY: WITHIN THE PAST 12 MONTHS, THE FOOD YOU BOUGHT JUST DIDN'T LAST AND YOU DIDN'T HAVE MONEY TO GET MORE.: NEVER TRUE

## 2025-01-22 SDOH — ECONOMIC STABILITY: FOOD INSECURITY: WITHIN THE PAST 12 MONTHS, YOU WORRIED THAT YOUR FOOD WOULD RUN OUT BEFORE YOU GOT MONEY TO BUY MORE.: NEVER TRUE

## 2025-01-22 ASSESSMENT — PATIENT HEALTH QUESTIONNAIRE - PHQ9
SUM OF ALL RESPONSES TO PHQ QUESTIONS 1-9: 0
SUM OF ALL RESPONSES TO PHQ QUESTIONS 1-9: 0
6. FEELING BAD ABOUT YOURSELF - OR THAT YOU ARE A FAILURE OR HAVE LET YOURSELF OR YOUR FAMILY DOWN: NOT AT ALL
8. MOVING OR SPEAKING SO SLOWLY THAT OTHER PEOPLE COULD HAVE NOTICED. OR THE OPPOSITE, BEING SO FIGETY OR RESTLESS THAT YOU HAVE BEEN MOVING AROUND A LOT MORE THAN USUAL: NOT AT ALL
4. FEELING TIRED OR HAVING LITTLE ENERGY: NOT AT ALL
7. TROUBLE CONCENTRATING ON THINGS, SUCH AS READING THE NEWSPAPER OR WATCHING TELEVISION: NOT AT ALL
2. FEELING DOWN, DEPRESSED OR HOPELESS: NOT AT ALL
1. LITTLE INTEREST OR PLEASURE IN DOING THINGS: NOT AT ALL
SUM OF ALL RESPONSES TO PHQ QUESTIONS 1-9: 0
3. TROUBLE FALLING OR STAYING ASLEEP: NOT AT ALL
9. THOUGHTS THAT YOU WOULD BE BETTER OFF DEAD, OR OF HURTING YOURSELF: NOT AT ALL
5. POOR APPETITE OR OVEREATING: NOT AT ALL
10. IF YOU CHECKED OFF ANY PROBLEMS, HOW DIFFICULT HAVE THESE PROBLEMS MADE IT FOR YOU TO DO YOUR WORK, TAKE CARE OF THINGS AT HOME, OR GET ALONG WITH OTHER PEOPLE: NOT DIFFICULT AT ALL
SUM OF ALL RESPONSES TO PHQ9 QUESTIONS 1 & 2: 0
SUM OF ALL RESPONSES TO PHQ QUESTIONS 1-9: 0

## 2025-01-22 ASSESSMENT — ENCOUNTER SYMPTOMS
ABDOMINAL PAIN: 1
EYES NEGATIVE: 1
RESPIRATORY NEGATIVE: 1

## 2025-01-22 NOTE — PROGRESS NOTES
Chief Complaint   Patient presents with    Anxiety    Depression    Prediabetes    Follow-up Chronic Condition    Hypothyroidism     \"Have you been to the ER, urgent care clinic since your last visit?  Hospitalized since your last visit?\"    NO    “Have you seen or consulted any other health care providers outside our system since your last visit?”    NO    Have you had a mammogram?”   NO    Date of last Mammogram: 5/26/2022       “Have you had a diabetic eye exam?”    NO     No diabetic eye exam on file          
hypothyroidism.    Diagnoses and all orders for this visit:    Type 2 diabetes mellitus without complication, without long-term current use of insulin (HCC)  -     metFORMIN (GLUCOPHAGE-XR) 750 MG extended release tablet; Take 1 tablet by mouth daily (with breakfast)  -     Comprehensive Metabolic Panel  -     Hemoglobin A1C  -     Albumin/Creatinine Ratio, Urine    Other migraine without status migrainosus, not intractable  -     rizatriptan (MAXALT) 10 MG tablet; Take 1 tablet by mouth daily as needed for Migraine May repeat in 2 hours if needed    Hypothyroidism due to Hashimoto's thyroiditis  -     levothyroxine (SYNTHROID) 25 MCG tablet; Take 1 tablet by mouth every morning (before breakfast)  -     TSH    Allergic rhinitis, unspecified  -     fluticasone (FLONASE) 50 MCG/ACT nasal spray; 2 sprays by Each Nostril route daily    Allergic rhinitis  -     fluticasone (FLONASE) 50 MCG/ACT nasal spray; 2 sprays by Each Nostril route daily    Allergic rhinitis due to pollen  -     fexofenadine (ALLEGRA) 180 MG tablet; Take 1 tablet by mouth daily as needed (allergy)    Major depressive disorder, single episode, moderate (HCC)  -     buPROPion (WELLBUTRIN SR) 100 MG extended release tablet; Take 1 tablet by mouth daily    Dyspepsia  -     omeprazole (PRILOSEC) 20 MG delayed release capsule; Take 1 capsule by mouth daily as needed (heart burn)    Vitamin D deficiency  -     Vitamin D 25 Hydroxy    Sebaceous cyst  -     [facility] - Cass Razo MD, [facility] ([address])        Assessment & Plan  1. Anxiety: Stable.  - Continue current medication regimen.    2. Depression: Stable.  - Continue current medication regimen.    3. Diabetes Mellitus: Stable.  - Continue current medication regimen.  - Monitor blood sugar levels at home regularly.  - Conduct comprehensive panel of laboratory tests to assess thyroid function, liver, kidney, and blood glucose levels.    4. Sebaceous Cyst.  - Referral to a surgeon for

## 2025-01-23 LAB
25(OH)D3+25(OH)D2 SERPL-MCNC: 18.1 NG/ML (ref 30–100)
ALBUMIN SERPL-MCNC: 4.2 G/DL (ref 3.9–4.9)
ALP SERPL-CCNC: 84 IU/L (ref 44–121)
ALT SERPL-CCNC: 15 IU/L (ref 0–32)
AST SERPL-CCNC: 19 IU/L (ref 0–40)
BILIRUB SERPL-MCNC: 0.2 MG/DL (ref 0–1.2)
BUN SERPL-MCNC: 10 MG/DL (ref 6–24)
BUN/CREAT SERPL: 18 (ref 9–23)
CALCIUM SERPL-MCNC: 9 MG/DL (ref 8.7–10.2)
CHLORIDE SERPL-SCNC: 101 MMOL/L (ref 96–106)
CO2 SERPL-SCNC: 21 MMOL/L (ref 20–29)
CREAT SERPL-MCNC: 0.56 MG/DL (ref 0.57–1)
EGFRCR SERPLBLD CKD-EPI 2021: 116 ML/MIN/1.73
GLOBULIN SER CALC-MCNC: 2.5 G/DL (ref 1.5–4.5)
GLUCOSE SERPL-MCNC: 125 MG/DL (ref 70–99)
POTASSIUM SERPL-SCNC: 4.3 MMOL/L (ref 3.5–5.2)
PROT SERPL-MCNC: 6.7 G/DL (ref 6–8.5)
SODIUM SERPL-SCNC: 139 MMOL/L (ref 134–144)
TSH SERPL DL<=0.005 MIU/L-ACNC: 1.97 UIU/ML (ref 0.45–4.5)

## 2025-01-28 ENCOUNTER — PATIENT MESSAGE (OUTPATIENT)
Age: 45
End: 2025-01-28

## 2025-01-28 DIAGNOSIS — E55.9 VITAMIN D DEFICIENCY: Primary | ICD-10-CM

## 2025-01-28 DIAGNOSIS — E11.9 TYPE 2 DIABETES MELLITUS WITHOUT COMPLICATION, WITHOUT LONG-TERM CURRENT USE OF INSULIN (HCC): ICD-10-CM

## 2025-01-28 NOTE — TELEPHONE ENCOUNTER
----- Message from Dr. Gale Chris MD sent at 1/27/2025 12:59 PM EST -----  vit D level very low.will start on vit D 50,000 unit 1 cap weekly for 4 months.will repeat level in 4 months.adv to be on milk product and expose to sun for 20 min a day.  Please call in vitamin D.  A1c 7.1, will change metformin XR to 500 mg 2 tablet every night.  Take it with food.  Need to be on 1800-calorie ADA diet and exercise.

## 2025-01-29 RX ORDER — METFORMIN HYDROCHLORIDE 500 MG/1
1000 TABLET, EXTENDED RELEASE ORAL
Qty: 180 TABLET | Refills: 1 | Status: SHIPPED | OUTPATIENT
Start: 2025-01-29

## 2025-01-29 RX ORDER — ERGOCALCIFEROL 1.25 MG/1
50000 CAPSULE, LIQUID FILLED ORAL WEEKLY
Qty: 4 CAPSULE | Refills: 3 | Status: SHIPPED | OUTPATIENT
Start: 2025-01-29

## 2025-05-01 NOTE — PROGRESS NOTES
PA approved for amitiza  From- 10/4/2019  [facility]

## 2025-05-28 DIAGNOSIS — E55.9 VITAMIN D DEFICIENCY: ICD-10-CM

## 2025-06-03 ENCOUNTER — OFFICE VISIT (OUTPATIENT)
Age: 45
End: 2025-06-03
Payer: COMMERCIAL

## 2025-06-03 VITALS
WEIGHT: 149 LBS | SYSTOLIC BLOOD PRESSURE: 120 MMHG | DIASTOLIC BLOOD PRESSURE: 80 MMHG | BODY MASS INDEX: 28.15 KG/M2 | OXYGEN SATURATION: 98 % | HEART RATE: 85 BPM

## 2025-06-03 DIAGNOSIS — R51.9 HEAD AND FACE PAIN: Primary | ICD-10-CM

## 2025-06-03 DIAGNOSIS — M54.2 NECK PAIN ON LEFT SIDE: ICD-10-CM

## 2025-06-03 DIAGNOSIS — E06.3 HYPOTHYROIDISM DUE TO HASHIMOTO'S THYROIDITIS: ICD-10-CM

## 2025-06-03 DIAGNOSIS — H93.11 EAR NOISE/BUZZING, RIGHT: ICD-10-CM

## 2025-06-03 DIAGNOSIS — E11.9 TYPE 2 DIABETES MELLITUS WITHOUT COMPLICATION, WITHOUT LONG-TERM CURRENT USE OF INSULIN (HCC): ICD-10-CM

## 2025-06-03 PROCEDURE — 3051F HG A1C>EQUAL 7.0%<8.0%: CPT | Performed by: INTERNAL MEDICINE

## 2025-06-03 PROCEDURE — 99214 OFFICE O/P EST MOD 30 MIN: CPT | Performed by: INTERNAL MEDICINE

## 2025-06-03 RX ORDER — LEVOTHYROXINE SODIUM 25 UG/1
25 TABLET ORAL
Qty: 90 TABLET | Refills: 1 | Status: SHIPPED | OUTPATIENT
Start: 2025-06-03

## 2025-06-03 RX ORDER — METFORMIN HYDROCHLORIDE 500 MG/1
1000 TABLET, EXTENDED RELEASE ORAL
Qty: 180 TABLET | Refills: 1 | Status: SHIPPED | OUTPATIENT
Start: 2025-06-03

## 2025-06-03 ASSESSMENT — ENCOUNTER SYMPTOMS: RESPIRATORY NEGATIVE: 1
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
## 2025-06-03 NOTE — PROGRESS NOTES
Have you been to the ER, urgent care clinic since your last visit?  Hospitalized since your last visit?   NO    Have you seen or consulted any other health care providers outside our system since your last visit?   NO    Have you had a mammogram?”   NO    Date of last Mammogram: 5/26/2022       “Have you had a diabetic eye exam?”    NO     No diabetic eye exam on file

## 2025-06-30 ENCOUNTER — OFFICE VISIT (OUTPATIENT)
Age: 45
End: 2025-06-30
Payer: COMMERCIAL

## 2025-06-30 VITALS
HEART RATE: 102 BPM | OXYGEN SATURATION: 98 % | WEIGHT: 152 LBS | SYSTOLIC BLOOD PRESSURE: 120 MMHG | HEIGHT: 61 IN | DIASTOLIC BLOOD PRESSURE: 78 MMHG | BODY MASS INDEX: 28.7 KG/M2

## 2025-06-30 DIAGNOSIS — H93.A1 PULSATILE TINNITUS OF RIGHT EAR: Primary | ICD-10-CM

## 2025-06-30 DIAGNOSIS — R51.9 LEFT-SIDED HEADACHE: ICD-10-CM

## 2025-06-30 PROCEDURE — 99204 OFFICE O/P NEW MOD 45 MIN: CPT | Performed by: PSYCHIATRY & NEUROLOGY

## 2025-06-30 RX ORDER — TOPIRAMATE 25 MG/1
25 TABLET, FILM COATED ORAL 2 TIMES DAILY
Qty: 180 TABLET | Refills: 1 | Status: SHIPPED | OUTPATIENT
Start: 2025-06-30

## 2025-06-30 NOTE — PROGRESS NOTES
Chief Complaint   Patient presents with    Neurologic Problem     Head and neck pain, right ear ringing \" I cannot sleep.\" Here with .      Vitals:    06/30/25 1041   BP: 120/78   Pulse: (!) 102   SpO2: 98%
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
## 2025-07-01 DIAGNOSIS — F32.1 MAJOR DEPRESSIVE DISORDER, SINGLE EPISODE, MODERATE (HCC): ICD-10-CM

## 2025-07-01 DIAGNOSIS — E06.3 HYPOTHYROIDISM DUE TO HASHIMOTO'S THYROIDITIS: ICD-10-CM

## 2025-07-01 DIAGNOSIS — E11.9 TYPE 2 DIABETES MELLITUS WITHOUT COMPLICATION, WITHOUT LONG-TERM CURRENT USE OF INSULIN (HCC): ICD-10-CM

## 2025-07-01 RX ORDER — LEVOTHYROXINE SODIUM 25 UG/1
25 TABLET ORAL
Qty: 90 TABLET | Refills: 1 | Status: SHIPPED | OUTPATIENT
Start: 2025-07-01

## 2025-07-01 RX ORDER — METFORMIN HYDROCHLORIDE 500 MG/1
1000 TABLET, EXTENDED RELEASE ORAL
Qty: 180 TABLET | Refills: 1 | Status: SHIPPED | OUTPATIENT
Start: 2025-07-01

## 2025-07-01 RX ORDER — BUPROPION HYDROCHLORIDE 100 MG/1
100 TABLET, EXTENDED RELEASE ORAL DAILY
Qty: 90 TABLET | Refills: 1 | Status: SHIPPED | OUTPATIENT
Start: 2025-07-01

## 2025-07-31 DIAGNOSIS — H93.A1 PULSATILE TINNITUS OF RIGHT EAR: Primary | ICD-10-CM

## 2025-07-31 DIAGNOSIS — R51.9 LEFT-SIDED HEADACHE: ICD-10-CM

## 2025-08-15 ENCOUNTER — APPOINTMENT (OUTPATIENT)
Facility: HOSPITAL | Age: 45
End: 2025-08-15
Attending: PSYCHIATRY & NEUROLOGY
Payer: COMMERCIAL

## 2025-08-15 ENCOUNTER — HOSPITAL ENCOUNTER (OUTPATIENT)
Facility: HOSPITAL | Age: 45
Discharge: HOME | End: 2025-08-18
Attending: PSYCHIATRY & NEUROLOGY
Payer: COMMERCIAL

## 2025-08-15 DIAGNOSIS — H93.A1 PULSATILE TINNITUS OF RIGHT EAR: ICD-10-CM

## 2025-08-15 PROCEDURE — 70544 MR ANGIOGRAPHY HEAD W/O DYE: CPT

## 2025-08-18 ENCOUNTER — RESULTS FOLLOW-UP (OUTPATIENT)
Age: 45
End: 2025-08-18

## 2025-09-04 ENCOUNTER — TELEPHONE (OUTPATIENT)
Age: 45
End: 2025-09-04

## 2025-09-04 ENCOUNTER — OFFICE VISIT (OUTPATIENT)
Age: 45
End: 2025-09-04

## 2025-09-04 VITALS
OXYGEN SATURATION: 97 % | HEART RATE: 92 BPM | DIASTOLIC BLOOD PRESSURE: 87 MMHG | TEMPERATURE: 98.7 F | WEIGHT: 148.6 LBS | BODY MASS INDEX: 28.08 KG/M2 | SYSTOLIC BLOOD PRESSURE: 126 MMHG

## 2025-09-04 DIAGNOSIS — Z11.3 SCREENING EXAMINATION FOR STD (SEXUALLY TRANSMITTED DISEASE): Primary | ICD-10-CM

## 2025-09-04 DIAGNOSIS — R35.0 URINARY FREQUENCY: ICD-10-CM

## 2025-09-04 DIAGNOSIS — R10.2 PELVIC PAIN: ICD-10-CM

## 2025-09-04 DIAGNOSIS — Z30.09 GENERAL COUNSELING AND ADVICE FOR CONTRACEPTIVE MANAGEMENT: ICD-10-CM

## 2025-09-04 LAB
BILIRUBIN, URINE, POC: NEGATIVE
BLOOD URINE, POC: NORMAL
GLUCOSE URINE, POC: NORMAL
HBV SURFACE AG SER QL: NONREACTIVE
HCV AB SER QL: NONREACTIVE
HIV 1+2 AB+HIV1 P24 AG SERPL QL IA: NONREACTIVE
HIV 1/2 RESULT COMMENT: NORMAL
KETONES, URINE, POC: NORMAL
LEUKOCYTE ESTERASE, URINE, POC: NEGATIVE
NITRITE, URINE, POC: NEGATIVE
PH, URINE, POC: 5 (ref 4.6–8)
PROTEIN,URINE, POC: NORMAL
SPECIFIC GRAVITY, URINE, POC: 1.02 (ref 1–1.03)
URINALYSIS CLARITY, POC: CLEAR
URINALYSIS COLOR, POC: YELLOW
UROBILINOGEN, POC: NORMAL

## 2025-09-04 RX ORDER — ACETAMINOPHEN AND CODEINE PHOSPHATE 120; 12 MG/5ML; MG/5ML
1 SOLUTION ORAL DAILY
Qty: 90 TABLET | Refills: 3 | Status: SHIPPED | OUTPATIENT
Start: 2025-09-04

## 2025-09-06 LAB
BACTERIA SPEC CULT: NORMAL
SERVICE CMNT-IMP: NORMAL

## 2025-09-07 LAB
C TRACH RRNA SPEC QL NAA+PROBE: NEGATIVE
N GONORRHOEA RRNA SPEC QL NAA+PROBE: NEGATIVE
SPECIMEN SOURCE: NORMAL
T PALLIDUM AB SER QL IA: NON REACTIVE
T VAGINALIS RRNA SPEC QL NAA+PROBE: NEGATIVE